# Patient Record
Sex: FEMALE | Race: BLACK OR AFRICAN AMERICAN | Employment: FULL TIME | URBAN - METROPOLITAN AREA
[De-identification: names, ages, dates, MRNs, and addresses within clinical notes are randomized per-mention and may not be internally consistent; named-entity substitution may affect disease eponyms.]

---

## 2021-04-03 ENCOUNTER — APPOINTMENT (OUTPATIENT)
Dept: GENERAL RADIOLOGY | Age: 36
End: 2021-04-03
Attending: EMERGENCY MEDICINE
Payer: COMMERCIAL

## 2021-04-03 ENCOUNTER — HOSPITAL ENCOUNTER (EMERGENCY)
Age: 36
Discharge: HOME | End: 2021-04-03
Attending: EMERGENCY MEDICINE
Payer: COMMERCIAL

## 2021-04-03 VITALS
HEART RATE: 88 BPM | WEIGHT: 135.8 LBS | DIASTOLIC BLOOD PRESSURE: 67 MMHG | BODY MASS INDEX: 21.31 KG/M2 | OXYGEN SATURATION: 98 % | HEIGHT: 67 IN | TEMPERATURE: 99.4 F | RESPIRATION RATE: 16 BRPM | SYSTOLIC BLOOD PRESSURE: 105 MMHG

## 2021-04-03 DIAGNOSIS — R50.9 FEBRILE ILLNESS, ACUTE: Primary | ICD-10-CM

## 2021-04-03 DIAGNOSIS — R11.2 NON-INTRACTABLE VOMITING WITH NAUSEA, UNSPECIFIED VOMITING TYPE: ICD-10-CM

## 2021-04-03 LAB
ALBUMIN SERPL-MCNC: 3.4 G/DL (ref 3.5–5)
ALBUMIN/GLOB SERPL: 0.8 {RATIO} (ref 1.1–2.2)
ALP SERPL-CCNC: 47 U/L (ref 45–117)
ALT SERPL-CCNC: 33 U/L (ref 12–78)
ANION GAP SERPL CALC-SCNC: 8 MMOL/L (ref 5–15)
APPEARANCE UR: ABNORMAL
AST SERPL-CCNC: 48 U/L (ref 15–37)
BACTERIA URNS QL MICRO: NEGATIVE /HPF
BASOPHILS # BLD: 0 K/UL (ref 0–0.1)
BASOPHILS NFR BLD: 0 % (ref 0–1)
BILIRUB SERPL-MCNC: 0.6 MG/DL (ref 0.2–1)
BILIRUB UR QL CFM: NEGATIVE
BUN SERPL-MCNC: 11 MG/DL (ref 6–20)
BUN/CREAT SERPL: 10 (ref 12–20)
CALCIUM SERPL-MCNC: 8 MG/DL (ref 8.5–10.1)
CHLORIDE SERPL-SCNC: 104 MMOL/L (ref 97–108)
CO2 SERPL-SCNC: 22 MMOL/L (ref 21–32)
COLOR UR: ABNORMAL
COMMENT, HOLDF: NORMAL
COVID-19 RAPID TEST, COVR: NOT DETECTED
CREAT SERPL-MCNC: 1.1 MG/DL (ref 0.55–1.02)
CRP SERPL-MCNC: 12.6 MG/DL (ref 0–0.6)
DIFFERENTIAL METHOD BLD: ABNORMAL
EOSINOPHIL # BLD: 0 K/UL (ref 0–0.4)
EOSINOPHIL NFR BLD: 0 % (ref 0–7)
EPITH CASTS URNS QL MICRO: ABNORMAL /LPF
ERYTHROCYTE [DISTWIDTH] IN BLOOD BY AUTOMATED COUNT: 17.4 % (ref 11.5–14.5)
FERRITIN SERPL-MCNC: 39 NG/ML (ref 8–252)
FIBRINOGEN PPP-MCNC: 341 MG/DL (ref 200–475)
GLOBULIN SER CALC-MCNC: 4.1 G/DL (ref 2–4)
GLUCOSE SERPL-MCNC: 132 MG/DL (ref 65–100)
GLUCOSE UR STRIP.AUTO-MCNC: NEGATIVE MG/DL
HCG UR QL: NEGATIVE
HCT VFR BLD AUTO: 31.4 % (ref 35–47)
HGB BLD-MCNC: 9.2 G/DL (ref 11.5–16)
HGB UR QL STRIP: ABNORMAL
IMM GRANULOCYTES # BLD AUTO: 0 K/UL
IMM GRANULOCYTES NFR BLD AUTO: 0 %
KETONES UR QL STRIP.AUTO: 15 MG/DL
LACTATE BLD-SCNC: 1.97 MMOL/L (ref 0.4–2)
LDH SERPL L TO P-CCNC: 363 U/L (ref 81–246)
LEUKOCYTE ESTERASE UR QL STRIP.AUTO: ABNORMAL
LYMPHOCYTES # BLD: 0.2 K/UL (ref 0.8–3.5)
LYMPHOCYTES NFR BLD: 2 % (ref 12–49)
MCH RBC QN AUTO: 22.3 PG (ref 26–34)
MCHC RBC AUTO-ENTMCNC: 29.3 G/DL (ref 30–36.5)
MCV RBC AUTO: 76.2 FL (ref 80–99)
MONOCYTES # BLD: 0.1 K/UL (ref 0–1)
MONOCYTES NFR BLD: 1 % (ref 5–13)
NEUTS BAND NFR BLD MANUAL: 8 % (ref 0–6)
NEUTS SEG # BLD: 9.6 K/UL (ref 1.8–8)
NEUTS SEG NFR BLD: 89 % (ref 32–75)
NITRITE UR QL STRIP.AUTO: NEGATIVE
NRBC # BLD: 0 K/UL (ref 0–0.01)
NRBC BLD-RTO: 0 PER 100 WBC
OTHER,OTHU: ABNORMAL
PH UR STRIP: 5.5 [PH] (ref 5–8)
PLATELET # BLD AUTO: 275 K/UL (ref 150–400)
PLATELET COMMENTS,PCOM: ABNORMAL
PMV BLD AUTO: 9.7 FL (ref 8.9–12.9)
POTASSIUM SERPL-SCNC: 3.7 MMOL/L (ref 3.5–5.1)
PROCALCITONIN SERPL-MCNC: 6.25 NG/ML
PROT SERPL-MCNC: 7.5 G/DL (ref 6.4–8.2)
PROT UR STRIP-MCNC: 100 MG/DL
RBC # BLD AUTO: 4.12 M/UL (ref 3.8–5.2)
RBC #/AREA URNS HPF: >100 /HPF (ref 0–5)
RBC MORPH BLD: ABNORMAL
SAMPLES BEING HELD,HOLD: NORMAL
SARS-COV-2, COV2: NORMAL
SODIUM SERPL-SCNC: 134 MMOL/L (ref 136–145)
SOURCE, COVRS: NORMAL
SP GR UR REFRACTOMETRY: >1.03 (ref 1–1.03)
UR CULT HOLD, URHOLD: NORMAL
UROBILINOGEN UR QL STRIP.AUTO: 1 EU/DL (ref 0.2–1)
WBC # BLD AUTO: 9.9 K/UL (ref 3.6–11)
WBC MORPH BLD: ABNORMAL
WBC URNS QL MICRO: ABNORMAL /HPF (ref 0–4)

## 2021-04-03 PROCEDURE — 81001 URINALYSIS AUTO W/SCOPE: CPT

## 2021-04-03 PROCEDURE — 81025 URINE PREGNANCY TEST: CPT

## 2021-04-03 PROCEDURE — 85025 COMPLETE CBC W/AUTO DIFF WBC: CPT

## 2021-04-03 PROCEDURE — 93005 ELECTROCARDIOGRAM TRACING: CPT

## 2021-04-03 PROCEDURE — 96361 HYDRATE IV INFUSION ADD-ON: CPT

## 2021-04-03 PROCEDURE — 87635 SARS-COV-2 COVID-19 AMP PRB: CPT

## 2021-04-03 PROCEDURE — 84145 PROCALCITONIN (PCT): CPT

## 2021-04-03 PROCEDURE — 96374 THER/PROPH/DIAG INJ IV PUSH: CPT

## 2021-04-03 PROCEDURE — 36415 COLL VENOUS BLD VENIPUNCTURE: CPT

## 2021-04-03 PROCEDURE — 74011250636 HC RX REV CODE- 250/636: Performed by: EMERGENCY MEDICINE

## 2021-04-03 PROCEDURE — 86140 C-REACTIVE PROTEIN: CPT

## 2021-04-03 PROCEDURE — 80053 COMPREHEN METABOLIC PANEL: CPT

## 2021-04-03 PROCEDURE — 82728 ASSAY OF FERRITIN: CPT

## 2021-04-03 PROCEDURE — U0005 INFEC AGEN DETEC AMPLI PROBE: HCPCS

## 2021-04-03 PROCEDURE — 96375 TX/PRO/DX INJ NEW DRUG ADDON: CPT

## 2021-04-03 PROCEDURE — 83605 ASSAY OF LACTIC ACID: CPT

## 2021-04-03 PROCEDURE — 85384 FIBRINOGEN ACTIVITY: CPT

## 2021-04-03 PROCEDURE — 71045 X-RAY EXAM CHEST 1 VIEW: CPT

## 2021-04-03 PROCEDURE — 74011250637 HC RX REV CODE- 250/637: Performed by: EMERGENCY MEDICINE

## 2021-04-03 PROCEDURE — 83615 LACTATE (LD) (LDH) ENZYME: CPT

## 2021-04-03 PROCEDURE — 99285 EMERGENCY DEPT VISIT HI MDM: CPT

## 2021-04-03 RX ORDER — ACETAMINOPHEN 500 MG
1000 TABLET ORAL
Status: COMPLETED | OUTPATIENT
Start: 2021-04-03 | End: 2021-04-03

## 2021-04-03 RX ORDER — ONDANSETRON 4 MG/1
4 TABLET, ORALLY DISINTEGRATING ORAL
Qty: 20 TAB | Refills: 0 | Status: SHIPPED | OUTPATIENT
Start: 2021-04-03 | End: 2021-04-06

## 2021-04-03 RX ORDER — ONDANSETRON 2 MG/ML
4 INJECTION INTRAMUSCULAR; INTRAVENOUS
Status: COMPLETED | OUTPATIENT
Start: 2021-04-03 | End: 2021-04-03

## 2021-04-03 RX ORDER — KETOROLAC TROMETHAMINE 30 MG/ML
15 INJECTION, SOLUTION INTRAMUSCULAR; INTRAVENOUS
Status: COMPLETED | OUTPATIENT
Start: 2021-04-03 | End: 2021-04-03

## 2021-04-03 RX ORDER — PROCHLORPERAZINE EDISYLATE 5 MG/ML
10 INJECTION INTRAMUSCULAR; INTRAVENOUS
Status: COMPLETED | OUTPATIENT
Start: 2021-04-03 | End: 2021-04-03

## 2021-04-03 RX ORDER — DIPHENHYDRAMINE HYDROCHLORIDE 50 MG/ML
25 INJECTION, SOLUTION INTRAMUSCULAR; INTRAVENOUS
Status: COMPLETED | OUTPATIENT
Start: 2021-04-03 | End: 2021-04-03

## 2021-04-03 RX ADMIN — SODIUM CHLORIDE 1000 ML: 9 INJECTION, SOLUTION INTRAVENOUS at 17:59

## 2021-04-03 RX ADMIN — SODIUM CHLORIDE 1000 ML: 9 INJECTION, SOLUTION INTRAVENOUS at 19:09

## 2021-04-03 RX ADMIN — KETOROLAC TROMETHAMINE 15 MG: 30 INJECTION, SOLUTION INTRAMUSCULAR at 19:58

## 2021-04-03 RX ADMIN — DIPHENHYDRAMINE HYDROCHLORIDE 25 MG: 50 INJECTION, SOLUTION INTRAMUSCULAR; INTRAVENOUS at 19:58

## 2021-04-03 RX ADMIN — ACETAMINOPHEN 1000 MG: 500 TABLET, FILM COATED ORAL at 17:59

## 2021-04-03 RX ADMIN — PROCHLORPERAZINE EDISYLATE 10 MG: 5 INJECTION INTRAMUSCULAR; INTRAVENOUS at 19:58

## 2021-04-03 RX ADMIN — ONDANSETRON 4 MG: 2 INJECTION INTRAMUSCULAR; INTRAVENOUS at 17:59

## 2021-04-03 NOTE — ED TRIAGE NOTES
Patient presents to ED for nausea/vomiting/diarrhea and dizziness. States that she started feeling \"under the weather\" yesterday and it has progressively worsened today to where she has been unable to keep any food down. Denies any sick contacts.
no

## 2021-04-03 NOTE — ED PROVIDER NOTES
Normally healthy 42-year-old female has had a fever and some vomiting. No significant cough or shortness of breath. No chest pain. No exposure to Covid that she knows of. No loss of taste or smell. No past medical history on file. No past surgical history on file. No family history on file. Social History     Socioeconomic History    Marital status: SINGLE     Spouse name: Not on file    Number of children: Not on file    Years of education: Not on file    Highest education level: Not on file   Occupational History    Not on file   Social Needs    Financial resource strain: Not on file    Food insecurity     Worry: Not on file     Inability: Not on file    Transportation needs     Medical: Not on file     Non-medical: Not on file   Tobacco Use    Smoking status: Not on file   Substance and Sexual Activity    Alcohol use: Not on file    Drug use: Not on file    Sexual activity: Not on file   Lifestyle    Physical activity     Days per week: Not on file     Minutes per session: Not on file    Stress: Not on file   Relationships    Social connections     Talks on phone: Not on file     Gets together: Not on file     Attends Denominational service: Not on file     Active member of club or organization: Not on file     Attends meetings of clubs or organizations: Not on file     Relationship status: Not on file    Intimate partner violence     Fear of current or ex partner: Not on file     Emotionally abused: Not on file     Physically abused: Not on file     Forced sexual activity: Not on file   Other Topics Concern    Not on file   Social History Narrative    Not on file         ALLERGIES: Patient has no known allergies. Review of Systems   Constitutional: Positive for fatigue and fever. HENT: Negative for trouble swallowing. Eyes: Negative for visual disturbance. Respiratory: Negative for cough. Cardiovascular: Negative for chest pain.    Gastrointestinal: Positive for vomiting. Negative for abdominal pain. Genitourinary: Negative for difficulty urinating. Musculoskeletal: Negative for gait problem. Skin: Negative for rash. Neurological: Negative for headaches. Hematological: Does not bruise/bleed easily. Psychiatric/Behavioral: Negative for sleep disturbance. Vitals:    04/03/21 1512 04/03/21 1911 04/03/21 1931   BP: (!) 103/52     Pulse: (!) 111     Resp: 18     Temp: (!) 103.1 °F (39.5 °C) (!) 101.2 °F (38.4 °C)    SpO2: 97%  97%   Weight: 61.6 kg (135 lb 12.9 oz)     Height: 5' 7\" (1.702 m)              Physical Exam  Constitutional:       Appearance: Normal appearance. HENT:      Head: Normocephalic. Nose: Nose normal.      Mouth/Throat:      Mouth: Mucous membranes are moist.   Eyes:      Extraocular Movements: Extraocular movements intact. Conjunctiva/sclera: Conjunctivae normal.   Cardiovascular:      Rate and Rhythm: Normal rate. Pulmonary:      Effort: Pulmonary effort is normal. No respiratory distress. Abdominal:      General: Abdomen is flat. Musculoskeletal: Normal range of motion. Skin:     Findings: No rash. Neurological:      General: No focal deficit present. Mental Status: She is alert. Psychiatric:         Behavior: Behavior normal.          MDM  Number of Diagnoses or Management Options  Febrile illness, acute  Non-intractable vomiting with nausea, unspecified vomiting type  Diagnosis management comments: EKG at 1907  Normal sinus with normal axis at a rate of 93  [place], QRS, and QTc all within normal limits  No ST changes  T wave inversion in lead III, V2, V3, and V4    No chest pain, no cough, no shortness of breath, no hypoxia, no leg swelling, no history of venous thromboembolism. Patient seems to have a viral illness that has caused fever and one episode of vomiting, but she otherwise looks well.   I do not see any respiratory symptoms, so even though this could be Covid, I do not think pursuing a PE work-up would be appropriate. She has a high heart rate likely from her fever and pain. I will give her fluids and analgesic/antipyretics as well as antiemetics to see if I can get her feeling better and get her heart rate better controlled. Rapid test for Covid and, if negative, send off a PCR. Chest x-ray was negative. She appears dry on her urine, but no evidence of UTI. Abdomen was soft and nontender and I do not think there is an acute intra-abdominal process that would require hospitalization or surgery. No neck stiffness or neck pain to make me think this is meningitis. Patient is also not confused. Inflammatory markers are up, which can be seen in Covid or some other viral infections. Procalcitonin was elevated, but I do not see any evidence of bacterial infection.          Procedures

## 2021-04-03 NOTE — ED NOTES

## 2021-04-04 LAB
SARS-COV-2, COV2: NOT DETECTED
SPECIMEN SOURCE, FCOV2M: NORMAL

## 2021-04-05 ENCOUNTER — PATIENT OUTREACH (OUTPATIENT)
Dept: CASE MANAGEMENT | Age: 36
End: 2021-04-05

## 2021-04-05 ENCOUNTER — HOSPITAL ENCOUNTER (EMERGENCY)
Age: 36
Discharge: HOME | DRG: 871 | End: 2021-04-05
Attending: EMERGENCY MEDICINE | Admitting: EMERGENCY MEDICINE
Payer: COMMERCIAL

## 2021-04-05 VITALS
SYSTOLIC BLOOD PRESSURE: 101 MMHG | TEMPERATURE: 99.9 F | HEART RATE: 88 BPM | OXYGEN SATURATION: 100 % | BODY MASS INDEX: 21.49 KG/M2 | HEIGHT: 67 IN | WEIGHT: 136.91 LBS | DIASTOLIC BLOOD PRESSURE: 53 MMHG | RESPIRATION RATE: 14 BRPM

## 2021-04-05 DIAGNOSIS — D64.89 ANEMIA DUE TO OTHER CAUSE, NOT CLASSIFIED: ICD-10-CM

## 2021-04-05 DIAGNOSIS — R11.2 NON-INTRACTABLE VOMITING WITH NAUSEA, UNSPECIFIED VOMITING TYPE: Primary | ICD-10-CM

## 2021-04-05 LAB
ALBUMIN SERPL-MCNC: 3 G/DL (ref 3.5–5)
ALBUMIN/GLOB SERPL: 0.9 {RATIO} (ref 1.1–2.2)
ALP SERPL-CCNC: 58 U/L (ref 45–117)
ALT SERPL-CCNC: 52 U/L (ref 12–78)
ANION GAP SERPL CALC-SCNC: 8 MMOL/L (ref 5–15)
APPEARANCE UR: CLEAR
AST SERPL-CCNC: 60 U/L (ref 15–37)
BACTERIA URNS QL MICRO: ABNORMAL /HPF
BASOPHILS # BLD: 0 K/UL (ref 0–0.1)
BASOPHILS NFR BLD: 0 % (ref 0–1)
BILIRUB SERPL-MCNC: 0.7 MG/DL (ref 0.2–1)
BILIRUB UR QL CFM: NEGATIVE
BUN SERPL-MCNC: 13 MG/DL (ref 6–20)
BUN/CREAT SERPL: 15 (ref 12–20)
CALCIUM SERPL-MCNC: 7.9 MG/DL (ref 8.5–10.1)
CHLORIDE SERPL-SCNC: 108 MMOL/L (ref 97–108)
CO2 SERPL-SCNC: 21 MMOL/L (ref 21–32)
COLOR UR: ABNORMAL
COMMENT, HOLDF: NORMAL
COMMENT, HOLDF: NORMAL
CREAT SERPL-MCNC: 0.85 MG/DL (ref 0.55–1.02)
DIFFERENTIAL METHOD BLD: ABNORMAL
EOSINOPHIL # BLD: 0 K/UL (ref 0–0.4)
EOSINOPHIL NFR BLD: 0 % (ref 0–7)
EPITH CASTS URNS QL MICRO: ABNORMAL /LPF
ERYTHROCYTE [DISTWIDTH] IN BLOOD BY AUTOMATED COUNT: 17.8 % (ref 11.5–14.5)
GLOBULIN SER CALC-MCNC: 3.5 G/DL (ref 2–4)
GLUCOSE SERPL-MCNC: 112 MG/DL (ref 65–100)
GLUCOSE UR STRIP.AUTO-MCNC: NEGATIVE MG/DL
HCG UR QL: NEGATIVE
HCT VFR BLD AUTO: 24.6 % (ref 35–47)
HGB BLD-MCNC: 7.6 G/DL (ref 11.5–16)
HGB UR QL STRIP: ABNORMAL
HYALINE CASTS URNS QL MICRO: ABNORMAL /LPF (ref 0–5)
IMM GRANULOCYTES # BLD AUTO: 0 K/UL
IMM GRANULOCYTES NFR BLD AUTO: 0 %
KETONES UR QL STRIP.AUTO: NEGATIVE MG/DL
LACTATE SERPL-SCNC: 1.7 MMOL/L (ref 0.4–2)
LEUKOCYTE ESTERASE UR QL STRIP.AUTO: NEGATIVE
LIPASE SERPL-CCNC: 410 U/L (ref 73–393)
LYMPHOCYTES # BLD: 0 K/UL (ref 0.8–3.5)
LYMPHOCYTES NFR BLD: 1 % (ref 12–49)
MCH RBC QN AUTO: 22.3 PG (ref 26–34)
MCHC RBC AUTO-ENTMCNC: 30.9 G/DL (ref 30–36.5)
MCV RBC AUTO: 72.1 FL (ref 80–99)
MONOCYTES # BLD: 0 K/UL (ref 0–1)
MONOCYTES NFR BLD: 0 % (ref 5–13)
NEUTS BAND NFR BLD MANUAL: 14 % (ref 0–6)
NEUTS SEG # BLD: 4.2 K/UL (ref 1.8–8)
NEUTS SEG NFR BLD: 85 % (ref 32–75)
NITRITE UR QL STRIP.AUTO: NEGATIVE
NRBC # BLD: 0 K/UL (ref 0–0.01)
NRBC BLD-RTO: 0 PER 100 WBC
PH UR STRIP: 5.5 [PH] (ref 5–8)
PLATELET # BLD AUTO: 173 K/UL (ref 150–400)
PMV BLD AUTO: 10.4 FL (ref 8.9–12.9)
POTASSIUM SERPL-SCNC: 3.5 MMOL/L (ref 3.5–5.1)
PROT SERPL-MCNC: 6.5 G/DL (ref 6.4–8.2)
PROT UR STRIP-MCNC: 100 MG/DL
RBC # BLD AUTO: 3.41 M/UL (ref 3.8–5.2)
RBC #/AREA URNS HPF: ABNORMAL /HPF (ref 0–5)
RBC MORPH BLD: ABNORMAL
RBC MORPH BLD: ABNORMAL
SAMPLES BEING HELD,HOLD: NORMAL
SAMPLES BEING HELD,HOLD: NORMAL
SODIUM SERPL-SCNC: 137 MMOL/L (ref 136–145)
SP GR UR REFRACTOMETRY: 1.02 (ref 1–1.03)
UROBILINOGEN UR QL STRIP.AUTO: 1 EU/DL (ref 0.2–1)
WBC # BLD AUTO: 4.2 K/UL (ref 3.6–11)
WBC URNS QL MICRO: ABNORMAL /HPF (ref 0–4)

## 2021-04-05 PROCEDURE — 85025 COMPLETE CBC W/AUTO DIFF WBC: CPT

## 2021-04-05 PROCEDURE — 80053 COMPREHEN METABOLIC PANEL: CPT

## 2021-04-05 PROCEDURE — 81025 URINE PREGNANCY TEST: CPT

## 2021-04-05 PROCEDURE — 36415 COLL VENOUS BLD VENIPUNCTURE: CPT

## 2021-04-05 PROCEDURE — 99284 EMERGENCY DEPT VISIT MOD MDM: CPT

## 2021-04-05 PROCEDURE — 74011250636 HC RX REV CODE- 250/636: Performed by: EMERGENCY MEDICINE

## 2021-04-05 PROCEDURE — 74011250637 HC RX REV CODE- 250/637: Performed by: EMERGENCY MEDICINE

## 2021-04-05 PROCEDURE — 83605 ASSAY OF LACTIC ACID: CPT

## 2021-04-05 PROCEDURE — 83690 ASSAY OF LIPASE: CPT

## 2021-04-05 PROCEDURE — 96374 THER/PROPH/DIAG INJ IV PUSH: CPT

## 2021-04-05 PROCEDURE — 81001 URINALYSIS AUTO W/SCOPE: CPT

## 2021-04-05 RX ORDER — ONDANSETRON 2 MG/ML
4 INJECTION INTRAMUSCULAR; INTRAVENOUS
Status: COMPLETED | OUTPATIENT
Start: 2021-04-05 | End: 2021-04-05

## 2021-04-05 RX ORDER — ACETAMINOPHEN 500 MG
1000 TABLET ORAL ONCE
Status: COMPLETED | OUTPATIENT
Start: 2021-04-05 | End: 2021-04-05

## 2021-04-05 RX ORDER — METOCLOPRAMIDE 10 MG/1
10 TABLET ORAL
Qty: 12 TAB | Refills: 0 | Status: SHIPPED | OUTPATIENT
Start: 2021-04-05 | End: 2021-04-06

## 2021-04-05 RX ORDER — IBUPROFEN 600 MG/1
600 TABLET ORAL
Qty: 30 TAB | Refills: 0 | Status: SHIPPED | OUTPATIENT
Start: 2021-04-05 | End: 2021-04-06

## 2021-04-05 RX ADMIN — ONDANSETRON 4 MG: 2 INJECTION INTRAMUSCULAR; INTRAVENOUS at 04:02

## 2021-04-05 RX ADMIN — SODIUM CHLORIDE 1000 ML: 9 INJECTION, SOLUTION INTRAVENOUS at 04:02

## 2021-04-05 RX ADMIN — ACETAMINOPHEN 1000 MG: 500 TABLET ORAL at 04:13

## 2021-04-05 NOTE — ED TRIAGE NOTES
Pt arrives ambulatory to triage w/ CC N/V and headache. Pt states she had a fever of 103.2 earlier in the day. States these sx started Friday. Was seen at [facility] yesterday and has negative workup. Denies sick contacts. Endorses diarrhea, abd pain, body aches, chills. Currently on her period. Denies CP/ SOB.

## 2021-04-05 NOTE — DISCHARGE INSTRUCTIONS
Thank you for allowing us to provide you with medical care today. We realize that you have many choices for your emergency care needs. We thank you for choosing [facility].  Please choose us in the future for any continued health care needs. We hope we addressed all of your medical concerns. We strive to provide excellent quality care in the Emergency Department. Anything less than excellent does not meet our expectations. The exam and treatment you received in the Emergency Department were for an emergent problem and are not intended as complete care. It is important that you follow up with a doctor, nurse practitioner, or [address] [place] [address] assistant for ongoing care. If your symptoms worsen or you do not improve as expected and you are unable to reach your usual health care provider, you should return to the Emergency Department. We are available 24 hours a day. Take this sheet with you when you go to your follow-up visit. If you have any problem arranging the follow-up visit, contact the Emergency Department immediately. Make an appointment your family doctor for follow up of this visit. Return to the ER if you are unable to be seen in a timely manner.

## 2021-04-05 NOTE — ED PROVIDER NOTES
Please note that this dictation was completed with [employer], the computer voice recognition software.  Quite often unanticipated grammatical, syntax, homophones, and other interpretive errors are inadvertently transcribed by the computer software.  Please disregard these errors.  Please excuse any errors that have escaped final proofreading. 80-year-old female no significant past medical history presents the ER complaining of \"been having fevers chills x3 days. Patient's states she is also been nauseated and vomiting x3 days (since Friday). Patient states her fever has been as high as 103 and is Thomas 100. She is also had generalized malaise was seen 2 days ago at [address] [place] [address] ER had a negative Covid test negative ED evaluation. Patient presents the ER here today because \"I think I am still sick. Patient states she attempted to take some ibuprofen this morning at 230 but was unable to keep it down and threw it up. Patient states she has not had any burning with urination completed her menstrual cycle earlier today and usually has been taking Tylenol to control her fevers. Patient states she has had 3-4 episodes of diarrhea yesterday and earlier today (no blood) and she had several episodes of vomiting yesterday with an increased number of episodes of vomiting today (4 times). pt denies HA, vison changes, diff swallowing, CP, SOB, Abd pain, Cons or other current systemic complaints    Social/ PSH reviewed in EMR    EMR Chart Reviewed           No past medical history on file. No past surgical history on file. No family history on file.     Social History     Socioeconomic History    Marital status: SINGLE     Spouse name: Not on file    Number of children: Not on file    Years of education: Not on file    Highest education level: Not on file   Occupational History    Not on file   Social Needs    Financial resource strain: Not on file    Food insecurity     Worry: Not on file     Inability: Not on file    Transportation needs     Medical: Not on file     Non-medical: Not on file   Tobacco Use    Smoking status: Not on file   Substance and Sexual Activity    Alcohol use: Not on file    Drug use: Not on file    Sexual activity: Not on file   Lifestyle    Physical activity     Days per week: Not on file     Minutes per session: Not on file    Stress: Not on file   Relationships    Social connections     Talks on phone: Not on file     Gets together: Not on file     Attends Nondenominational service: Not on file     Active member of club or organization: Not on file     Attends meetings of clubs or organizations: Not on file     Relationship status: Not on file    Intimate partner violence     Fear of current or ex partner: Not on file     Emotionally abused: Not on file     Physically abused: Not on file     Forced sexual activity: Not on file   Other Topics Concern    Not on file   Social History Narrative    Not on file         ALLERGIES: Patient has no known allergies. Review of Systems   Constitutional: Positive for chills and fever. HENT: Negative for drooling, sore throat, trouble swallowing and voice change. Eyes: Negative for photophobia and visual disturbance. Respiratory: Negative for cough, choking, chest tightness and shortness of breath. Cardiovascular: Negative for chest pain, palpitations and leg swelling. Gastrointestinal: Positive for abdominal pain, diarrhea, nausea and vomiting. Genitourinary: Positive for vaginal bleeding. Negative for dysuria and vaginal discharge. Musculoskeletal: Positive for myalgias. Skin: Negative for rash. Neurological: Negative for speech difficulty. All other systems reviewed and are negative.       Vitals:    04/05/21 0428 04/05/21 0445 04/05/21 0510 04/05/21 0512   BP:  94/65     Pulse:    88   Resp:       Temp:   (!) 100.9 °F (38.3 °C)    SpO2: 100% 100%     Weight:       Height:                Physical Exam  Vitals signs and nursing note reviewed. Constitutional:       General: She is not in acute distress. Appearance: Normal appearance. She is well-developed. She is not ill-appearing, toxic-appearing or diaphoretic. HENT:      Head: Normocephalic and atraumatic. Right Ear: External ear normal.      Left Ear: External ear normal.   Eyes:      General: No scleral icterus. Right eye: No discharge. Left eye: No discharge. Extraocular Movements: Extraocular movements intact. Conjunctiva/sclera: Conjunctivae normal.      Pupils: Pupils are equal, round, and reactive to light. Neck:      Musculoskeletal: Normal range of motion and neck supple. Vascular: No JVD. Trachea: No tracheal deviation. Cardiovascular:      Rate and Rhythm: Normal rate and regular rhythm. Pulses: Normal pulses. Heart sounds: Normal heart sounds. No murmur. No friction rub. No gallop. Pulmonary:      Effort: Pulmonary effort is normal. No respiratory distress. Breath sounds: Normal breath sounds. No stridor. No wheezing, rhonchi or rales. Chest:      Chest wall: No tenderness. Abdominal:      General: Bowel sounds are normal.      Palpations: Abdomen is soft. Tenderness: There is no abdominal tenderness. There is no guarding or rebound. Hernia: No hernia is present. Comments: nttp       Genitourinary:     Vagina: No vaginal discharge. Comments: Pt denies urinary/ vaginal complaints  Musculoskeletal: Normal range of motion. General: No tenderness or deformity. Right lower leg: No edema. Left lower leg: No edema. Skin:     General: Skin is warm and dry. Capillary Refill: Capillary refill takes less than 2 seconds. Coloration: Skin is not jaundiced or pale. Findings: No bruising, erythema, lesion or rash. Neurological:      General: No focal deficit present. Mental Status: She is alert and oriented to person, place, and time.       Cranial Nerves: No cranial nerve deficit. Sensory: No sensory deficit. Motor: No weakness or abnormal muscle tone. Coordination: Coordination normal.      Gait: Gait normal.      Deep Tendon Reflexes: Reflexes normal.   Psychiatric:         Behavior: Behavior normal.         Thought Content: Thought content normal.          MDM       Procedures    6:14 AM 'feeling better now'; Offered/ refused CT abd/Pel; Pt has had menorrhagia/ no prior transfusions; discussed using flintstones vitamins w/ iron, reglan and ibu; She agrees w/ this plan;  [facility] [place]  results have been reviewed with her. She has been counseled regarding her diagnosis. She verbally conveys understanding and agreement of the signs, symptoms, diagnosis, treatment and prognosis and additionally agrees to Call/ Arrange follow up as recommended with Jacques Chavez MD in 24 - 48 hours. She also agrees with the care-plan and conveys that all of her questions have been answered. I have also put together some discharge instructions for her that include: 1) educational information regarding their diagnosis, 2) how to care for their diagnosis at home, as well a 3) list of reasons why they would want to return to the ED prior to their follow-up appointment, should their condition change or for concerns.

## 2021-04-05 NOTE — ED NOTES
Patient received discharge instructions by MD and RN. Reviewed discharge instructions with patient. Patient verbalized understanding of discharge teaching. Patient left ED via wheelchair. Patient reports relief of most intense pain.

## 2021-04-05 NOTE — PROGRESS NOTES
Patient contacted regarding Carola Liming. Discussed COVID-19 related testing which was available at this time. Test results were negative. Patient informed of results, if available? yes     LPN Care Coordinator contacted the patient by telephone to perform post discharge assessment. Call within 2 business days of discharge: Yes Verified name and  with patient as identifiers. Provided introduction to self, and explanation of the CTN/ACM role, and reason for call due to risk factors for infection and/or exposure to COVID-19. Symptoms reviewed with patient who verbalized the following symptoms: no new symptoms and no worsening symptoms      Due to no new or worsening symptoms encounter was not routed to provider for escalation. Discussed follow-up appointments. If no appointment was previously scheduled, appointment scheduling offered:  [facility] follow up appointment(s): No future appointments. Non-[facility] follow up appointment(s): patient is to schedule follow up appointment if needed. Contact information given regarding [facility]. Advance Care Planning:   Does patient have an Advance Directive:  patient report Devin Bae (mother) [phone] is the healthcare decision maker. Patient has following risk factors of: no known risk factors. LPN CC reviewed discharge instructions, medical action plan and red flags such as increased shortness of breath, increasing fever and signs of decompensation with patient who verbalized understanding. Discussed exposure protocols and quarantine with CDC Guidelines What to do if you are sick with coronavirus disease .  Patient was given an opportunity for questions and concerns. The patient agrees to contact the [facility] exposure line [phone], local [facility]  [phone] and PCP office for questions related to their healthcare. LPN CC provided contact information for future needs.     Reviewed and educated patient on any new and changed medications related to discharge diagnosis     Was patient discharged with a pulse oximeter? no     Patient/family/caregiver given information for [employer] and agrees to enroll no      Plan for follow-up call in 5-7 days based on severity of symptoms and risk factors.

## 2021-04-06 ENCOUNTER — APPOINTMENT (OUTPATIENT)
Dept: MRI IMAGING | Age: 36
DRG: 871 | End: 2021-04-06
Attending: HOSPITALIST
Payer: COMMERCIAL

## 2021-04-06 ENCOUNTER — HOSPITAL ENCOUNTER (INPATIENT)
Age: 36
LOS: 6 days | Discharge: HOME | DRG: 871 | End: 2021-04-13
Attending: EMERGENCY MEDICINE | Admitting: HOSPITALIST
Payer: COMMERCIAL

## 2021-04-06 ENCOUNTER — APPOINTMENT (OUTPATIENT)
Dept: CT IMAGING | Age: 36
DRG: 871 | End: 2021-04-06
Attending: EMERGENCY MEDICINE
Payer: COMMERCIAL

## 2021-04-06 ENCOUNTER — APPOINTMENT (OUTPATIENT)
Dept: MRI IMAGING | Age: 36
DRG: 871 | End: 2021-04-06
Attending: EMERGENCY MEDICINE
Payer: COMMERCIAL

## 2021-04-06 DIAGNOSIS — R50.9 FEVER OF UNKNOWN ORIGIN: ICD-10-CM

## 2021-04-06 DIAGNOSIS — G93.89 CEREBELLAR MASS: Primary | ICD-10-CM

## 2021-04-06 DIAGNOSIS — R50.9 FEVER, UNSPECIFIED FEVER CAUSE: ICD-10-CM

## 2021-04-06 DIAGNOSIS — I21.A1 TYPE 2 MI (MYOCARDIAL INFARCTION) (HCC): ICD-10-CM

## 2021-04-06 DIAGNOSIS — D64.9 ACUTE ON CHRONIC ANEMIA: ICD-10-CM

## 2021-04-06 DIAGNOSIS — I63.10 CEREBROVASCULAR ACCIDENT (CVA) DUE TO EMBOLISM OF PRECEREBRAL ARTERY (HCC): ICD-10-CM

## 2021-04-06 PROBLEM — R42 DIZZINESS: Status: ACTIVE | Noted: 2021-04-06

## 2021-04-06 LAB
ALBUMIN SERPL-MCNC: 2.8 G/DL (ref 3.5–5)
ALBUMIN/GLOB SERPL: 0.8 {RATIO} (ref 1.1–2.2)
ALP SERPL-CCNC: 85 U/L (ref 45–117)
ALT SERPL-CCNC: 72 U/L (ref 12–78)
ANION GAP SERPL CALC-SCNC: 10 MMOL/L (ref 5–15)
AST SERPL-CCNC: 125 U/L (ref 15–37)
ATRIAL RATE: 93 BPM
ATRIAL RATE: 96 BPM
BASOPHILS # BLD: 0 K/UL (ref 0–0.1)
BASOPHILS NFR BLD: 0 % (ref 0–1)
BILIRUB DIRECT SERPL-MCNC: 0.6 MG/DL (ref 0–0.2)
BILIRUB SERPL-MCNC: 1 MG/DL (ref 0.2–1)
BNP SERPL-MCNC: 4918 PG/ML
BUN SERPL-MCNC: 14 MG/DL (ref 6–20)
BUN/CREAT SERPL: 16 (ref 12–20)
CALCIUM SERPL-MCNC: 7.7 MG/DL (ref 8.5–10.1)
CALCULATED P AXIS, ECG09: 71 DEGREES
CALCULATED P AXIS, ECG09: 72 DEGREES
CALCULATED R AXIS, ECG10: 50 DEGREES
CALCULATED R AXIS, ECG10: 60 DEGREES
CALCULATED T AXIS, ECG11: 166 DEGREES
CALCULATED T AXIS, ECG11: 78 DEGREES
CHLORIDE SERPL-SCNC: 107 MMOL/L (ref 97–108)
CHOLEST SERPL-MCNC: 68 MG/DL
CO2 SERPL-SCNC: 21 MMOL/L (ref 21–32)
COMMENT, HOLDF: NORMAL
COMMENT, HOLDF: NORMAL
CREAT SERPL-MCNC: 0.89 MG/DL (ref 0.55–1.02)
CRP SERPL-MCNC: 22.2 MG/DL (ref 0–0.6)
DIAGNOSIS, 93000: NORMAL
DIAGNOSIS, 93000: NORMAL
DIFFERENTIAL METHOD BLD: ABNORMAL
EOSINOPHIL # BLD: 0 K/UL (ref 0–0.4)
EOSINOPHIL NFR BLD: 0 % (ref 0–7)
ERYTHROCYTE [DISTWIDTH] IN BLOOD BY AUTOMATED COUNT: 18.1 % (ref 11.5–14.5)
ERYTHROCYTE [SEDIMENTATION RATE] IN BLOOD: 42 MM/HR (ref 0–20)
ERYTHROCYTE [SEDIMENTATION RATE] IN BLOOD: 50 MM/HR (ref 0–20)
EST. AVERAGE GLUCOSE BLD GHB EST-MCNC: 111 MG/DL
FACT VIII ACT/NOR PPP: 239 % (ref 80–200)
FIBRINOGEN PPP-MCNC: 625 MG/DL (ref 200–475)
GLOBULIN SER CALC-MCNC: 3.6 G/DL (ref 2–4)
GLUCOSE BLD STRIP.AUTO-MCNC: 110 MG/DL (ref 65–100)
GLUCOSE SERPL-MCNC: 103 MG/DL (ref 65–100)
HAPTOGLOB SERPL-MCNC: 276 MG/DL (ref 30–200)
HBA1C MFR BLD: 5.5 % (ref 4–5.6)
HCT VFR BLD AUTO: 23.5 % (ref 35–47)
HDLC SERPL-MCNC: 17 MG/DL
HDLC SERPL: 4 {RATIO} (ref 0–5)
HGB BLD-MCNC: 7 G/DL (ref 11.5–16)
HISTORY CHECKED?,CKHIST: NORMAL
IMM GRANULOCYTES # BLD AUTO: 0 K/UL
IMM GRANULOCYTES NFR BLD AUTO: 0 %
INR PPP: 1.1 (ref 0.9–1.1)
IRON SATN MFR SERPL: ABNORMAL % (ref 20–50)
IRON SERPL-MCNC: <5 UG/DL (ref 35–150)
LDH SERPL L TO P-CCNC: 309 U/L (ref 81–246)
LDLC SERPL CALC-MCNC: 16.6 MG/DL (ref 0–100)
LIPID PROFILE,FLP: ABNORMAL
LYMPHOCYTES # BLD: 0.1 K/UL (ref 0.8–3.5)
LYMPHOCYTES NFR BLD: 3 % (ref 12–49)
MCH RBC QN AUTO: 21.9 PG (ref 26–34)
MCHC RBC AUTO-ENTMCNC: 29.8 G/DL (ref 30–36.5)
MCV RBC AUTO: 73.4 FL (ref 80–99)
MONOCYTES # BLD: 0.1 K/UL (ref 0–1)
MONOCYTES NFR BLD: 2 % (ref 5–13)
NEUTS BAND NFR BLD MANUAL: 7 % (ref 0–6)
NEUTS SEG # BLD: 3.6 K/UL (ref 1.8–8)
NEUTS SEG NFR BLD: 88 % (ref 32–75)
NRBC # BLD: 0 K/UL (ref 0–0.01)
NRBC BLD-RTO: 0 PER 100 WBC
P-R INTERVAL, ECG05: 154 MS
P-R INTERVAL, ECG05: 160 MS
PLATELET # BLD AUTO: 134 K/UL (ref 150–400)
PLATELET COMMENTS,PCOM: ABNORMAL
PMV BLD AUTO: 10.7 FL (ref 8.9–12.9)
POTASSIUM SERPL-SCNC: 3.8 MMOL/L (ref 3.5–5.1)
PROT SERPL-MCNC: 6.4 G/DL (ref 6.4–8.2)
PROTHROMBIN TIME: 11 SEC (ref 9–11.1)
Q-T INTERVAL, ECG07: 344 MS
Q-T INTERVAL, ECG07: 372 MS
QRS DURATION, ECG06: 74 MS
QRS DURATION, ECG06: 80 MS
QTC CALCULATION (BEZET), ECG08: 434 MS
QTC CALCULATION (BEZET), ECG08: 462 MS
RBC # BLD AUTO: 3.2 M/UL (ref 3.8–5.2)
RBC MORPH BLD: ABNORMAL
RETICS # AUTO: 0.01 M/UL (ref 0.02–0.08)
RETICS/RBC NFR AUTO: 0.2 % (ref 0.7–2.1)
SAMPLES BEING HELD,HOLD: NORMAL
SAMPLES BEING HELD,HOLD: NORMAL
SARS-COV-2 TOTAL ANTIBODY, CVTOT: NONREACTIVE
SARS-COV-2, COV2: NORMAL
SERVICE CMNT-IMP: ABNORMAL
SODIUM SERPL-SCNC: 138 MMOL/L (ref 136–145)
T4 FREE SERPL-MCNC: 1.4 NG/DL (ref 0.8–1.5)
TIBC SERPL-MCNC: 303 UG/DL (ref 250–450)
TRIGL SERPL-MCNC: 172 MG/DL (ref ?–150)
TROPONIN I SERPL-MCNC: 0.47 NG/ML
TROPONIN I SERPL-MCNC: 0.56 NG/ML
TROPONIN I SERPL-MCNC: 0.61 NG/ML
TSH SERPL DL<=0.05 MIU/L-ACNC: 0.47 UIU/ML (ref 0.36–3.74)
VENTRICULAR RATE, ECG03: 93 BPM
VENTRICULAR RATE, ECG03: 96 BPM
VLDLC SERPL CALC-MCNC: 34.4 MG/DL
WBC # BLD AUTO: 3.8 K/UL (ref 3.6–11)

## 2021-04-06 PROCEDURE — 99285 EMERGENCY DEPT VISIT HI MDM: CPT

## 2021-04-06 PROCEDURE — 87077 CULTURE AEROBIC IDENTIFY: CPT

## 2021-04-06 PROCEDURE — 74011250636 HC RX REV CODE- 250/636: Performed by: HOSPITALIST

## 2021-04-06 PROCEDURE — 99218 HC RM OBSERVATION: CPT

## 2021-04-06 PROCEDURE — 74011250637 HC RX REV CODE- 250/637: Performed by: NURSE PRACTITIONER

## 2021-04-06 PROCEDURE — 86901 BLOOD TYPING SEROLOGIC RH(D): CPT

## 2021-04-06 PROCEDURE — 86923 COMPATIBILITY TEST ELECTRIC: CPT

## 2021-04-06 PROCEDURE — A9575 INJ GADOTERATE MEGLUMI 0.1ML: HCPCS | Performed by: HOSPITALIST

## 2021-04-06 PROCEDURE — 86146 BETA-2 GLYCOPROTEIN ANTIBODY: CPT

## 2021-04-06 PROCEDURE — 36430 TRANSFUSION BLD/BLD COMPNT: CPT

## 2021-04-06 PROCEDURE — 86147 CARDIOLIPIN ANTIBODY EA IG: CPT

## 2021-04-06 PROCEDURE — 74011000258 HC RX REV CODE- 258: Performed by: EMERGENCY MEDICINE

## 2021-04-06 PROCEDURE — 93005 ELECTROCARDIOGRAM TRACING: CPT

## 2021-04-06 PROCEDURE — 30233N1 TRANSFUSION OF NONAUTOLOGOUS RED BLOOD CELLS INTO PERIPHERAL VEIN, PERCUTANEOUS APPROACH: ICD-10-PCS | Performed by: HOSPITALIST

## 2021-04-06 PROCEDURE — 96375 TX/PRO/DX INJ NEW DRUG ADDON: CPT

## 2021-04-06 PROCEDURE — 74011250636 HC RX REV CODE- 250/636: Performed by: EMERGENCY MEDICINE

## 2021-04-06 PROCEDURE — 84443 ASSAY THYROID STIM HORMONE: CPT

## 2021-04-06 PROCEDURE — 86140 C-REACTIVE PROTEIN: CPT

## 2021-04-06 PROCEDURE — 87040 BLOOD CULTURE FOR BACTERIA: CPT

## 2021-04-06 PROCEDURE — P9016 RBC LEUKOCYTES REDUCED: HCPCS

## 2021-04-06 PROCEDURE — 96374 THER/PROPH/DIAG INJ IV PUSH: CPT

## 2021-04-06 PROCEDURE — APPNB30 APP NON BILLABLE TIME 0-30 MINS: Performed by: NURSE PRACTITIONER

## 2021-04-06 PROCEDURE — 70553 MRI BRAIN STEM W/O & W/DYE: CPT

## 2021-04-06 PROCEDURE — 81241 F5 GENE: CPT

## 2021-04-06 PROCEDURE — 80061 LIPID PANEL: CPT

## 2021-04-06 PROCEDURE — 0042T CT CODE NEURO PERF W CBF: CPT

## 2021-04-06 PROCEDURE — 83010 ASSAY OF HAPTOGLOBIN QUANT: CPT

## 2021-04-06 PROCEDURE — 99223 1ST HOSP IP/OBS HIGH 75: CPT | Performed by: PSYCHIATRY & NEUROLOGY

## 2021-04-06 PROCEDURE — 70450 CT HEAD/BRAIN W/O DYE: CPT

## 2021-04-06 PROCEDURE — 84439 ASSAY OF FREE THYROXINE: CPT

## 2021-04-06 PROCEDURE — 85652 RBC SED RATE AUTOMATED: CPT

## 2021-04-06 PROCEDURE — 83036 HEMOGLOBIN GLYCOSYLATED A1C: CPT

## 2021-04-06 PROCEDURE — 85306 CLOT INHIBIT PROT S FREE: CPT

## 2021-04-06 PROCEDURE — 87147 CULTURE TYPE IMMUNOLOGIC: CPT

## 2021-04-06 PROCEDURE — 84484 ASSAY OF TROPONIN QUANT: CPT

## 2021-04-06 PROCEDURE — 4A03X5D MEASUREMENT OF ARTERIAL FLOW, INTRACRANIAL, EXTERNAL APPROACH: ICD-10-PCS | Performed by: RADIOLOGY

## 2021-04-06 PROCEDURE — 74011000636 HC RX REV CODE- 636: Performed by: RADIOLOGY

## 2021-04-06 PROCEDURE — 85610 PROTHROMBIN TIME: CPT

## 2021-04-06 PROCEDURE — 74011250637 HC RX REV CODE- 250/637: Performed by: PSYCHIATRY & NEUROLOGY

## 2021-04-06 PROCEDURE — 99223 1ST HOSP IP/OBS HIGH 75: CPT | Performed by: INTERNAL MEDICINE

## 2021-04-06 PROCEDURE — 85045 AUTOMATED RETICULOCYTE COUNT: CPT

## 2021-04-06 PROCEDURE — 85302 CLOT INHIBIT PROT C ANTIGEN: CPT

## 2021-04-06 PROCEDURE — 83880 ASSAY OF NATRIURETIC PEPTIDE: CPT

## 2021-04-06 PROCEDURE — 83615 LACTATE (LD) (LDH) ENZYME: CPT

## 2021-04-06 PROCEDURE — 82962 GLUCOSE BLOOD TEST: CPT

## 2021-04-06 PROCEDURE — 80048 BASIC METABOLIC PNL TOTAL CA: CPT

## 2021-04-06 PROCEDURE — 85732 THROMBOPLASTIN TIME PARTIAL: CPT

## 2021-04-06 PROCEDURE — 85300 ANTITHROMBIN III ACTIVITY: CPT

## 2021-04-06 PROCEDURE — U0003 INFECTIOUS AGENT DETECTION BY NUCLEIC ACID (DNA OR RNA); SEVERE ACUTE RESPIRATORY SYNDROME CORONAVIRUS 2 (SARS-COV-2) (CORONAVIRUS DISEASE [COVID-19]), AMPLIFIED PROBE TECHNIQUE, MAKING USE OF HIGH THROUGHPUT TECHNOLOGIES AS DESCRIBED BY CMS-2020-01-R: HCPCS

## 2021-04-06 PROCEDURE — 85384 FIBRINOGEN ACTIVITY: CPT

## 2021-04-06 PROCEDURE — 80076 HEPATIC FUNCTION PANEL: CPT

## 2021-04-06 PROCEDURE — 85305 CLOT INHIBIT PROT S TOTAL: CPT

## 2021-04-06 PROCEDURE — 83550 IRON BINDING TEST: CPT

## 2021-04-06 PROCEDURE — 85303 CLOT INHIBIT PROT C ACTIVITY: CPT

## 2021-04-06 PROCEDURE — 70498 CT ANGIOGRAPHY NECK: CPT

## 2021-04-06 PROCEDURE — 85240 CLOT FACTOR VIII AHG 1 STAGE: CPT

## 2021-04-06 PROCEDURE — 87186 SC STD MICRODIL/AGAR DIL: CPT

## 2021-04-06 PROCEDURE — 85025 COMPLETE CBC W/AUTO DIFF WBC: CPT

## 2021-04-06 PROCEDURE — 94762 N-INVAS EAR/PLS OXIMTRY CONT: CPT

## 2021-04-06 PROCEDURE — 36415 COLL VENOUS BLD VENIPUNCTURE: CPT

## 2021-04-06 PROCEDURE — 86769 SARS-COV-2 COVID-19 ANTIBODY: CPT

## 2021-04-06 RX ORDER — GADOTERATE MEGLUMINE 376.9 MG/ML
12 INJECTION INTRAVENOUS
Status: COMPLETED | OUTPATIENT
Start: 2021-04-06 | End: 2021-04-06

## 2021-04-06 RX ORDER — ASPIRIN 81 MG/1
81 TABLET ORAL DAILY
Status: DISCONTINUED | OUTPATIENT
Start: 2021-04-06 | End: 2021-04-06

## 2021-04-06 RX ORDER — GUAIFENESIN 100 MG/5ML
81 LIQUID (ML) ORAL DAILY
Status: DISCONTINUED | OUTPATIENT
Start: 2021-04-06 | End: 2021-04-13 | Stop reason: HOSPADM

## 2021-04-06 RX ORDER — SODIUM CHLORIDE 9 MG/ML
250 INJECTION, SOLUTION INTRAVENOUS AS NEEDED
Status: DISCONTINUED | OUTPATIENT
Start: 2021-04-06 | End: 2021-04-13 | Stop reason: HOSPADM

## 2021-04-06 RX ORDER — ATORVASTATIN CALCIUM 40 MG/1
40 TABLET, FILM COATED ORAL DAILY
Status: DISCONTINUED | OUTPATIENT
Start: 2021-04-06 | End: 2021-04-12

## 2021-04-06 RX ORDER — SODIUM CHLORIDE 0.9 % (FLUSH) 0.9 %
5-40 SYRINGE (ML) INJECTION EVERY 8 HOURS
Status: DISCONTINUED | OUTPATIENT
Start: 2021-04-06 | End: 2021-04-13 | Stop reason: HOSPADM

## 2021-04-06 RX ORDER — ACETAMINOPHEN 325 MG/1
650 TABLET ORAL
Status: DISCONTINUED | OUTPATIENT
Start: 2021-04-06 | End: 2021-04-13 | Stop reason: HOSPADM

## 2021-04-06 RX ORDER — SODIUM CHLORIDE 0.9 % (FLUSH) 0.9 %
10 SYRINGE (ML) INJECTION
Status: COMPLETED | OUTPATIENT
Start: 2021-04-06 | End: 2021-04-06

## 2021-04-06 RX ORDER — SODIUM CHLORIDE 9 MG/ML
25 INJECTION, SOLUTION INTRAVENOUS CONTINUOUS
Status: DISCONTINUED | OUTPATIENT
Start: 2021-04-06 | End: 2021-04-13 | Stop reason: HOSPADM

## 2021-04-06 RX ORDER — SODIUM CHLORIDE 0.9 % (FLUSH) 0.9 %
5-40 SYRINGE (ML) INJECTION AS NEEDED
Status: DISCONTINUED | OUTPATIENT
Start: 2021-04-06 | End: 2021-04-13 | Stop reason: HOSPADM

## 2021-04-06 RX ORDER — DEXAMETHASONE SODIUM PHOSPHATE 10 MG/ML
10 INJECTION INTRAMUSCULAR; INTRAVENOUS
Status: COMPLETED | OUTPATIENT
Start: 2021-04-06 | End: 2021-04-06

## 2021-04-06 RX ADMIN — LEVETIRACETAM 1000 MG: 100 INJECTION, SOLUTION INTRAVENOUS at 06:11

## 2021-04-06 RX ADMIN — IOPAMIDOL 20 ML: 755 INJECTION, SOLUTION INTRAVENOUS at 04:48

## 2021-04-06 RX ADMIN — Medication 10 ML: at 11:50

## 2021-04-06 RX ADMIN — DEXAMETHASONE SODIUM PHOSPHATE 10 MG: 10 INJECTION, SOLUTION INTRAMUSCULAR; INTRAVENOUS at 05:55

## 2021-04-06 RX ADMIN — SODIUM CHLORIDE 100 ML/HR: 9 INJECTION, SOLUTION INTRAVENOUS at 08:31

## 2021-04-06 RX ADMIN — IOPAMIDOL 100 ML: 755 INJECTION, SOLUTION INTRAVENOUS at 04:48

## 2021-04-06 RX ADMIN — Medication 10 ML: at 16:58

## 2021-04-06 RX ADMIN — ACETAMINOPHEN 650 MG: 325 TABLET, FILM COATED ORAL at 10:57

## 2021-04-06 RX ADMIN — ACETAMINOPHEN 650 MG: 325 TABLET, FILM COATED ORAL at 16:57

## 2021-04-06 RX ADMIN — ASPIRIN 81 MG: 81 TABLET, CHEWABLE ORAL at 16:57

## 2021-04-06 RX ADMIN — Medication 10 ML: at 22:00

## 2021-04-06 RX ADMIN — GADOTERATE MEGLUMINE 12 ML: 376.9 INJECTION INTRAVENOUS at 12:08

## 2021-04-06 NOTE — PROGRESS NOTES
Neurosurgery Progress Note  Xander Raad, [facility]          Admit Date: 2021   LOS: 0 days        Daily Progress Note: 2021      Subjective: The patient developed dizziness on Friday. She states she gets dizzy around her menstrual cycle. She just finished her menstrual cycle yesterday. She has uterine fibroids and has menorrhagia. The patient was weak all over and had body aches since Saturday. The patient apparently had some slurred speech and left facial droop at 1530 yesterday and the slurred speech resolved at 2200. She was seen at [facility] ER on  for fever and vomiting. She had a negative COVID test including rapid and PCR. She was sent home. She returned to the [facility] ER on  for vomiting. She had continued fevers, general malaise, dizziness. She was feeling better after her stay in the ER and refused a CT of the abdomen/pelvis. She discharged to home, but returned to [facility] ER due to the facial droop and slurred speech. She continues to complain of terrible body aches and joint pains in her legs. She states she sometimes sees spots in her field of vision but denies double vision. Denies the sensation of the room spinning with her dizziness. States it feels more like an off balance feeling. Her head CT showed possible cerebellar lesions. We were asked to evaluate the patient for that reason. Denies numbness, tingling, chest pain, difficulty swallowing, double vision and dyspnea, cough, exposure to COVID-19. Objective:     Vital signs  Temp (24hrs), Av.5 °F (37.5 °C), Min:98.1 °F (36.7 °C), Max:101.9 °F (38.8 °C)   No intake/output data recorded.    1901 -  0700  In: 100 [I.V.:100]  Out: -     Visit Vitals  /63 (BP 1 Location: Left upper arm, BP Patient Position: At rest)   Pulse (!) 113   Temp (!) 101.8 °F (38.8 °C)   Resp 28   SpO2 100%      O2 Device: Room air     Pain control  Pain Assessment  Pain Scale 1: Numeric (0 - 10)  Pain Intensity 1: 0    PT/OT  Gait                 Physical Exam:  Gen:NAD. Neuro: A&Ox3. Follows commands. Speech clear. Affect normal.  PERRL. EOMI. Flattening of nasolabial fold at rest that disappears when muscles are activated. Tongue midline. CUELLO spontaneously. Strength 5/5 in UE and LE BL. Negative drift. Mild ataxia in left hand with FTN. Otherwise coordination intact with HTS and TEOFILO.  Gait deferred. CT head without contrast on 04/06/21 shows areas of low-attenuation in both cerebellar hemispheres, left greater than right, may represent old infarcts, although underlying mass lesions cannot be excluded; consider further evaluation with contrast enhanced brain MRI. No evidence of acute infarct or intracranial hemorrhage. MRI brain with and without contrast on 04/06/21 shows numerous supratentorial and infratentorial acute infarcts as above, largest in the left superior cerebellum, most consistent with embolic etiology.     24 hour results:    Recent Results (from the past 24 hour(s))   TYPE & SCREEN    Collection Time: 04/06/21  4:46 AM   Result Value Ref Range    Crossmatch Expiration 04/09/2021,2359     ABO/Rh(D) Nguyen Bo POSITIVE     Antibody screen NEG     Unit number [id number]     Blood component type RC LR     Unit division 00     Status of unit ISSUED     Crossmatch result Compatible    SED RATE (ESR)    Collection Time: 04/06/21  4:46 AM   Result Value Ref Range    Sed rate, automated 42 (H) 0 - 20 mm/hr   C REACTIVE PROTEIN, QT    Collection Time: 04/06/21  4:46 AM   Result Value Ref Range    C-Reactive protein 22.20 (H) 0.00 - 0.60 mg/dL   CBC WITH AUTOMATED DIFF    Collection Time: 04/06/21  4:47 AM   Result Value Ref Range    WBC 3.8 3.6 - 11.0 K/uL    RBC 3.20 (L) 3.80 - 5.20 M/uL    HGB 7.0 (L) 11.5 - 16.0 g/dL    HCT 23.5 (L) 35.0 - 47.0 %    MCV 73.4 (L) 80.0 - 99.0 FL    MCH 21.9 (L) 26.0 - 34.0 PG    MCHC 29.8 (L) 30.0 - 36.5 g/dL    RDW 18.1 (H) 11.5 - 14.5 %    PLATELET 728 (L) 522 - 400 K/uL    MPV 10.7 8.9 - 12.9 FL    NRBC 0.0 0  WBC    ABSOLUTE NRBC 0.00 0.00 - 0.01 K/uL    NEUTROPHILS 88 (H) 32 - 75 %    BAND NEUTROPHILS 7 (H) 0 - 6 %    LYMPHOCYTES 3 (L) 12 - 49 %    MONOCYTES 2 (L) 5 - 13 %    EOSINOPHILS 0 0 - 7 %    BASOPHILS 0 0 - 1 %    IMMATURE GRANULOCYTES 0 %    ABS. NEUTROPHILS 3.6 1.8 - 8.0 K/UL    ABS. LYMPHOCYTES 0.1 (L) 0.8 - 3.5 K/UL    ABS. MONOCYTES 0.1 0.0 - 1.0 K/UL    ABS. EOSINOPHILS 0.0 0.0 - 0.4 K/UL    ABS. BASOPHILS 0.0 0.0 - 0.1 K/UL    ABS. IMM. GRANS. 0.0 K/UL    DF MANUAL      PLATELET COMMENTS Large Platelets      RBC COMMENTS ANISOCYTOSIS  1+        RBC COMMENTS OVALOCYTES  PRESENT        RBC COMMENTS KANU CELLS  PRESENT       METABOLIC PANEL, BASIC    Collection Time: 04/06/21  4:47 AM   Result Value Ref Range    Sodium 138 136 - 145 mmol/L    Potassium 3.8 3.5 - 5.1 mmol/L    Chloride 107 97 - 108 mmol/L    CO2 21 21 - 32 mmol/L    Anion gap 10 5 - 15 mmol/L    Glucose 103 (H) 65 - 100 mg/dL    BUN 14 6 - 20 MG/DL    Creatinine 0.89 0.55 - 1.02 MG/DL    BUN/Creatinine ratio 16 12 - 20      GFR est AA >60 >60 ml/min/1.73m2    GFR est non-AA >60 >60 ml/min/1.73m2    Calcium 7.7 (L) 8.5 - 10.1 MG/DL   PROTHROMBIN TIME + INR    Collection Time: 04/06/21  4:47 AM   Result Value Ref Range    INR 1.1 0.9 - 1.1      Prothrombin time 11.0 9.0 - 11.1 sec   TROPONIN I    Collection Time: 04/06/21  4:47 AM   Result Value Ref Range    Troponin-I, Qt. 0.56 (H) <0.05 ng/mL   SAMPLES BEING HELD    Collection Time: 04/06/21  4:47 AM   Result Value Ref Range    SAMPLES BEING HELD 1red     COMMENT        Add-on orders for these samples will be processed based on acceptable specimen integrity and analyte stability, which may vary by analyte.    SARS-COV-2 AB, TOTAL    Collection Time: 04/06/21  4:47 AM   Result Value Ref Range    SARS-CoV-2 Ab, Total NONREACTIVE NR     GLUCOSE, POC    Collection Time: 04/06/21  5:08 AM   Result Value Ref Range    Glucose (POC) 110 (H) 65 - 100 mg/dL Performed by Corrie Roman    EKG, 12 LEAD, INITIAL    Collection Time: 04/06/21  5:10 AM   Result Value Ref Range    Ventricular Rate 96 BPM    Atrial Rate 96 BPM    P-R Interval 154 ms    QRS Duration 74 ms    Q-T Interval 344 ms    QTC Calculation (Bezet) 434 ms    Calculated P Axis 72 degrees    Calculated R Axis 60 degrees    Calculated T Axis 78 degrees    Diagnosis       Normal sinus rhythm  Low voltage QRS  When compared with ECG of 03-APR-2021 19:07,  MANUAL COMPARISON REQUIRED, DATA IS UNCONFIRMED     RBC, ALLOCATE    Collection Time: 04/06/21  6:30 AM   Result Value Ref Range    HISTORY CHECKED? Historical check performed    SAMPLES BEING HELD    Collection Time: 04/06/21  7:00 AM   Result Value Ref Range    SAMPLES BEING HELD 1PST     COMMENT        Add-on orders for these samples will be processed based on acceptable specimen integrity and analyte stability, which may vary by analyte. TROPONIN I    Collection Time: 04/06/21  8:33 AM   Result Value Ref Range    Troponin-I, Qt. 0.47 (H) <0.05 ng/mL   LIPID PANEL    Collection Time: 04/06/21  8:33 AM   Result Value Ref Range    LIPID PROFILE          Cholesterol, total 68 <200 MG/DL    Triglyceride 172 (H) <150 MG/DL    HDL Cholesterol 17 MG/DL    LDL, calculated 16.6 0 - 100 MG/DL    VLDL, calculated 34.4 MG/DL    CHOL/HDL Ratio 4.0 0.0 - 5.0     HEMOGLOBIN A1C WITH EAG    Collection Time: 04/06/21  8:33 AM   Result Value Ref Range    Hemoglobin A1c 5.5 4.0 - 5.6 %    Est. average glucose 111 mg/dL          Assessment:     Active Problems:    Dizziness (4/6/2021)        Plan:   1. Embolic stroke   - MRI shows that the patient has multiple areas of stroke in the supratentorial and infratentorial area. No brain masses seen on MRI   - Neurology consult with stroke work-up   - no surgical intervention needed. The patient's strokes likely happened days ago when symptoms began. Plan d/w Dr. Dina Huff, ER nurse, mom at bedside. Will be available as needed. Please call if questions.        Kayla Mejia NP
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
## 2021-04-06 NOTE — PROGRESS NOTES
Neurocritical Care Code Stroke Documentation    Symptoms:    Dizziness, slurred speech, left sided facial droop, seen in ED yesterday for similar complaints found to have Hgb of 7.6   Last Known Well:   [address] hx:   None    Anticoagulation:   None   VAN:   Negative   NIHSS:   1a-LOC:0    1b-Month/Age:0    1c-Open/Close Hand:0    2-Best Gaze:0    3-Visual Fields:0    4-Facial Palsy:0 (left droop at rest, no droop when muscles are activated)    5a-Left Arm:0    5b-Right Arm:0    6a-Left Le    6b-Right Le    7-Limb Ataxia:0    8-Sensory:0    9-Best Language:0    10-Dysarthria:0    11-Extinction/Inattention:0  TOTAL SCORE:0   Imaging:   CT Areas of low-attenuation in both cerebellar hemispheres, left greater than right, may represent old infarcts, although underlying mass lesions cannot be excluded; consider further evaluation with contrast enhanced brain MRI. No evidence of acute infarct or intracranial hemorrhage. Plan:   TPA Candidate: NO    Mechanical thrombectomy Candidate: NO     Discussed with: Dr. Tutu Orosco    Time spent: 30 minutes.      Manju Moore NP  Neurocritical Care Nurse Practitioner  [phone]

## 2021-04-06 NOTE — ROUTINE PROCESS
TRANSFER - OUT REPORT: 
 
Verbal report given to NAVA Casey (name) on Nichole Desai  being transferred to room 404 (4E) (unit) for routine progression of care Report consisted of patients Situation, Background, Assessment and  
Recommendations(SBAR). Information from the following report(s) SBAR, ED Summary and MAR was reviewed with the receiving nurse. Lines:  
Peripheral IV 04/06/21 Right Antecubital (Active) Site Assessment Clean, dry, & intact 04/06/21 0449 Phlebitis Assessment 0 04/06/21 0449 Infiltration Assessment 0 04/06/21 0449 Dressing Status Clean, dry, & intact 04/06/21 0449 Dressing Type Transparent 04/06/21 0449 Hub Color/Line Status Pink;Flushed;Patent 04/06/21 0449 Action Taken Blood drawn 04/06/21 0449 Alcohol Cap Used No 04/06/21 0449 Peripheral IV 04/06/21 Left Forearm (Active) Site Assessment Clean, dry, & intact 04/06/21 0736 Phlebitis Assessment 0 04/06/21 0736 Infiltration Assessment 0 04/06/21 0736 Dressing Status Clean, dry, & intact 04/06/21 0736 Dressing Type Transparent 04/06/21 0736 Hub Color/Line Status Pink;Flushed;Patent 04/06/21 0736 Action Taken Blood drawn 04/06/21 0736 Alcohol Cap Used No 04/06/21 0736 Opportunity for questions and clarification was provided. Patient transported with: 
 [employer]

## 2021-04-06 NOTE — ED PROVIDER NOTES
The history is provided by the patient and a relative. Facial Droop  This is a new problem. The current episode started 1 to 2 hours ago. The problem has been rapidly improving. There was left facial focality noted. Primary symptoms include loss of balance and slurred speech. Pertinent negatives include no focal weakness, no memory loss and no mental status change. There has been a fever of 103 - 104 F. The fever has been present for 1 - 2 days. Associated symptoms include vomiting and nausea. Pertinent negatives include no shortness of breath, no confusion and no headaches. Associated symptoms comments: Fatigue, poor appetite, poor intake of fluids. History reviewed. No pertinent past medical history. No past surgical history on file. History reviewed. No pertinent family history.     Social History     Socioeconomic History    Marital status: SINGLE     Spouse name: Not on file    Number of children: Not on file    Years of education: Not on file    Highest education level: Not on file   Occupational History    Not on file   Social Needs    Financial resource strain: Not on file    Food insecurity     Worry: Not on file     Inability: Not on file    Transportation needs     Medical: Not on file     Non-medical: Not on file   Tobacco Use    Smoking status: Not on file   Substance and Sexual Activity    Alcohol use: Not on file    Drug use: Not on file    Sexual activity: Not on file   Lifestyle    Physical activity     Days per week: Not on file     Minutes per session: Not on file    Stress: Not on file   Relationships    Social connections     Talks on phone: Not on file     Gets together: Not on file     Attends Gnosticist service: Not on file     Active member of club or organization: Not on file     Attends meetings of clubs or organizations: Not on file     Relationship status: Not on file    Intimate partner violence     Fear of current or ex partner: Not on file     Emotionally abused: Not on file     Physically abused: Not on file     Forced sexual activity: Not on file   Other Topics Concern    Not on file   Social History Narrative    Not on file         ALLERGIES: Patient has no known allergies. Review of Systems   Respiratory: Negative for shortness of breath. Gastrointestinal: Positive for nausea and vomiting. Neurological: Positive for loss of balance. Negative for focal weakness and headaches. Psychiatric/Behavioral: Negative for confusion and memory loss. All other systems reviewed and are negative. Vitals:    04/06/21 0402 04/06/21 0530   BP: (!) 97/57 98/63   Pulse: (!) 109 (!) 102   Resp: 18 24   Temp: 98.1 °F (36.7 °C) 98.1 °F (36.7 °C)   SpO2: 100% 100%            Physical Exam  Vitals signs and nursing note reviewed. Constitutional:       General: She is not in acute distress. Appearance: She is well-developed. HENT:      Head: Normocephalic and atraumatic. Eyes:      Conjunctiva/sclera: Conjunctivae normal.   Neck:      Musculoskeletal: Neck supple. Cardiovascular:      Rate and Rhythm: Normal rate and regular rhythm. Heart sounds: Normal heart sounds. Pulmonary:      Effort: Pulmonary effort is normal. No respiratory distress. Breath sounds: Normal breath sounds. Abdominal:      General: There is no distension. Palpations: Abdomen is soft. Tenderness: There is no abdominal tenderness. There is no guarding. Musculoskeletal: Normal range of motion. General: No deformity. Skin:     General: Skin is warm and dry. Neurological:      Mental Status: She is alert and oriented to person, place, and time. Cranial Nerves: No cranial nerve deficit. Sensory: No sensory deficit. Coordination: Coordination normal.      Gait: Gait abnormal (staggering slightly, difficulty with tandem gait).    Psychiatric:         Behavior: Behavior normal.          MDM     80-year-old female presents with new onset of ataxia, some facial asymmetry and slurring of speech overnight. She has been seen here twice previously with febrile illness thought to be viral in nature and was being managed symptomatically. CT scan shows hypodensity in the cerebellum concerning for malignancy. Neurosurgery Dr. Haley Rg is consulted. Anemia appears to be worsening and now she has evidence of cardiac ischemia which is likely secondary. Seizure prophylaxis with Keppra, Decadron for mass-effect, and 1 unit of red blood cells ordered for resuscitation. Procedures    Critical Care Time: 33 minutes  I personally performed critical care time separate of billable procedures which may include ordering and interpretation of testing, ordering of medications, direct patient assessment and reassessment, discussion with consultants or family, and documentation. EKG 0510: Rate 96, Normal sinus rhythm, No ST segment or T wave abnormalities. Low voltage. Otherwise normal EKG. Perfect Serve Consult for Admission  6:18 AM    ED Room Number: ER06/06  Patient Name and age:  Sarah Espinoza 28 y.o.  female  Working Diagnosis:   1. Cerebellar mass    2. Acute on chronic anemia    3. Type 2 MI (myocardial infarction) ([address]    4.  Fever of unknown origin        COVID-19 Suspicion:  no  Sepsis present:  no  Reassessment needed: no  Code Status:  Full Code  Readmission: no  Isolation Requirements:  yes  Recommended Level of Care:  telemetry  Department:[facility] Adult ED - 21

## 2021-04-06 NOTE — Clinical Note
Patient Class[de-identified] OBSERVATION [729]   Type of Bed: Neuro/Stroke [9]   Reason for Observation: Dizziness, slurred speech   Admitting Diagnosis: Dizziness [[id number]]   Admitting Physician: Cynthia Maradiaga [[id number]]   Attending Physician: Alban Cook

## 2021-04-06 NOTE — PROGRESS NOTES
Consult received. Discussed with Neurology. Initial workup pending and will contact us if hypercoagulable workup abnormal. Consult cancelled per neurology.

## 2021-04-06 NOTE — PROGRESS NOTES
Physical Therapy 4/6/2021    Orders received and chart reviewed up to date. Pt currently in ED, admitted. Awaiting MRI for cerebellar mass lesion and neurosurgery consult. Will defer PT and follow-up as appropriate. Thank you.   Oriana Carter, PT, DPT

## 2021-04-06 NOTE — PROGRESS NOTES
TRANSFER - IN REPORT:    Verbal report received from NAVA Galdamez(name) on Nichole Desai  being received from ED(unit) for routine progression of care      Report consisted of patients Situation, Background, Assessment and   Recommendations(SBAR). Information from the following report(s) SBAR, Kardex, ED Summary, Intake/Output, MAR, Accordion, Recent Results, Med Rec Status, Cardiac Rhythm NSR and Alarm Parameters  was reviewed with the receiving nurse. Opportunity for questions and clarification was provided. Assessment completed upon patients arrival to unit and care assumed. Bedside shift change report given to Bladimir Mejia RN (oncoming nurse) by Mike Hubbard RN (offgoing nurse). Report included the following information SBAR, Kardex, ED Summary, Intake/Output, MAR, Accordion, Recent Results, Med Rec Status, Cardiac Rhythm NSR and Alarm Parameters .

## 2021-04-06 NOTE — CONSULTS
[facility]: Jorge Miles  [phone]  Requesting/referring provider:   Reason for Consult:    HPI: Kole Mendoza, a 28y.o. year-old who presents for evaluation of dizziness. Has PMH of anemia due to heavy periods. Developed fevers, chills, N&V on Friday, also had dizziness. Went to [facility] ED , covid test negative and went home. Dizziness continued, she had near syncopal episode on Friday but did not lose consciousness. She returned to ER on 4/5/21 with vomiting, fevers, dizziness and malaise. Was evaluated and discharged home. Returned to ER today with dizziness, slurred speech, altered gait as well as vision disturbances. States last night she developed slurred speech and vision disturbance (seeing white spots). No SOB, chest pain. No history of palpitations. She returned to ER where she had MRI brain which showed numerous supratentorial and infratential acute infarcts most consistent with embolic etiology. Cardiology has been consulted for BRITTA. Additional evaluation shows mild troponin elevation with flat trend and anemia, hgb 7. She has exhibited fevers. She denies any history of arrhythmias but reports that her pcp told her she had irregular heart beat. No history of cardiac testing. SH: never smoker, occasional glass of wine. Denies IV drug use. Takes B12 po and oregano. Works for [employer] at [employer], [employer] degree  FH: Mother HTN, tachycardia. Father HTN. Maternal grandfather had MI at age 37. Reviewed her presentation, afib, emboli etc. Needs echo asap to help determine cardiac status. hypercoag and infectious workups underway. Comfortable, in NAD, seems a bit tired/sleepy but no chest pain, no dyspnea now. Agreeable to current plans. Assessment/Plan: 1. Acute CVA, multiple   -concerning for embolic cva.   -Neurology following. Hypercoagulable workup in process.     -Will obtain stat TTE, if unremarkable, no vegetation or thrombus noted, will proceed with BRITTA on Thursday afternoon with Dr. Cynthia Bush.    -Continue telemetry for now. 2.Elevated troponin   -0.56-0.47, flat trend   -EKG with no ischemic findings.   -no chest pain or exertional symptoms   -Troponin elevation of uncertain etiology, may be due to demand of CVA but will monitor. Follow up echo. 3. Fevers   -Blood cultures pending   -further workup per primary team   -Covid PCR negative. .    4. Anemia,    -likely blood loss due to heavy menses. -PRBCs were transfused, 1 unit. She  has no past medical history on file. Cardiovascular ROS: no chest pain or dyspnea on exertion, no palpitations  Respiratory ROS: no cough, shortness of breath, or wheezing  Neurological ROS: +dizziness, had slurred speech (resolved), abnormal gait   General: fatigue, body aches. All other systems negative except as above. PE  Vitals:    04/06/21 0838 04/06/21 0908 04/06/21 1008 04/06/21 1050   BP: (!) 87/58 (!) 88/65 (!) 104/59 105/63   Pulse: (!) 107 100 (!) 108 (!) 113   Resp: 21 28 19 28   Temp: 98.5 °F (36.9 °C) 99.1 °F (37.3 °C) (!) 101.9 °F (38.8 °C) (!) 101.8 °F (38.8 °C)   SpO2: 100% 100% 100% 100%    There is no height or weight on file to calculate BMI.    General appearance - alert, well appearing, and in no distress  Mental status - affect appropriate to mood  Eyes - sclera anicteric, moist mucous membranes  Neck - supple, no significant adenopathy  Lymphatics - no  lymphadenopathy  Chest - clear to auscultation, no wheezes, rales or rhonchi  Heart - normal rate, regular rhythm, normal S1, S2, no murmurs, rubs, clicks or gallops  Abdomen - soft, nontender, nondistended, no masses or organomegaly  Back exam - symmetric  Neurological -CUELLO,  no focal deficit  Musculoskeletal - no muscular tenderness noted, normal strength  Extremities - peripheral pulses normal, no pedal edema  Skin - normal coloration  no rashes    Recent Labs:  Lab Results   Component Value Date/Time    Cholesterol, total 68 04/06/2021 08:33 AM    HDL Cholesterol 17 04/06/2021 08:33 AM    LDL, calculated 16.6 04/06/2021 08:33 AM    Triglyceride 172 (H) 04/06/2021 08:33 AM    CHOL/HDL Ratio 4.0 04/06/2021 08:33 AM     Lab Results   Component Value Date/Time    Creatinine 0.89 04/06/2021 04:47 AM     Lab Results   Component Value Date/Time    BUN 14 04/06/2021 04:47 AM     Lab Results   Component Value Date/Time    Potassium 3.8 04/06/2021 04:47 AM     Lab Results   Component Value Date/Time    Hemoglobin A1c 5.5 04/06/2021 08:33 AM     Lab Results   Component Value Date/Time    HGB 7.0 (L) 04/06/2021 04:47 AM     Lab Results   Component Value Date/Time    PLATELET 402 (L) 32/00/7441 04:47 AM       Reviewed:  History reviewed. No pertinent past medical history. Social History     Tobacco Use   Smoking Status Not on file     Social History     Substance and Sexual Activity   Alcohol Use None     No Known Allergies    Current Facility-Administered Medications   Medication Dose Route Frequency    0.9% sodium chloride infusion 250 mL  250 mL IntraVENous PRN    sodium chloride (NS) flush 5-40 mL  5-40 mL IntraVENous Q8H    sodium chloride (NS) flush 5-40 mL  5-40 mL IntraVENous PRN    0.9% sodium chloride infusion  100 mL/hr IntraVENous CONTINUOUS    atorvastatin (LIPITOR) tablet 40 mg  40 mg Oral DAILY    acetaminophen (TYLENOL) tablet 650 mg  650 mg Oral Q4H PRN    aspirin chewable tablet 81 mg  81 mg Oral DAILY     No current outpatient medications on file. Klarissa Sagastume.  FORREST Hernandes  [facility] [place]  [address], [address] [place] [address]

## 2021-04-06 NOTE — H&P
History & Physical    Primary Care Provider: Other, MD Jacques  Source of Information: Patient     History of Presenting Illness:   Yefri Lund is a 28 y.o. female who presents with dizziness. History obtained from the patient and her mother at bedside. As per patient initially went to [address] [place] [address] ED for fever chills for 3 days with nausea vomiting a Covid test was negative there and was sent home. Patient keep on feeling dizzy and came back to [facility] ER. Initial code CTA CT showed no acute issue but there was a probability of some findings in the cerebellum MRI was ordered. Patient also history of fibroid and heavy menstrual bleeding she has symptomatic anemia and has a follow-up appointment with OB/GYN next Thursday at [place]. She complained of fever and not associated with  any burning with urination completed her menstrual cycle earlier today and usually has been taking Tylenol to control her fevers. Patient states she has had 3-4 episodes of diarrhea yesterday and earlier today (no blood) and she had several episodes of vomiting yesterday with an increased number of episodes of vomiting today  Admitted for further management       Review of Systems:  Pertinent items are noted in the History of Present Illness. History reviewed. No pertinent past medical history. No past surgical history on file. Prior to Admission medications    Medication Sig Start Date End Date Taking? Authorizing Provider   metoclopramide HCl (Reglan) 10 mg tablet Take 1 Tab by mouth every six (6) hours as needed for Nausea for up to 10 days. 4/5/21 4/15/21  Loli Mcclendon MD   ibuprofen (MOTRIN) 600 mg tablet Take 1 Tab by mouth every eight (8) hours as needed for Pain. 4/5/21   Loli Mcclendon MD   ondansetron (Zofran ODT) 4 mg disintegrating tablet 1 Tab by SubLINGual route every eight (8) hours as needed for Nausea or Vomiting.  4/3/21   Douglas JUAREZ, DO     No Known Allergies   History reviewed. No pertinent family history. SOCIAL HISTORY:  Patient resides:  Independently x   Assisted Living    SNF    With family care       Smoking history:   None x   Former    Chronic      Alcohol history:   None x   Social    Chronic      Ambulates:   Independently x   w/cane    w/walker    w/wc    CODE STATUS:  DNR    Full x   Other      Objective:     Physical Exam:     Visit Vitals  BP (!) 87/58 (BP 1 Location: Left upper arm)   Pulse (!) 107   Temp 98.5 °F (36.9 °C)   Resp 21   SpO2 100%      O2 Device: Room air    General:  Alert, cooperative, no distress, appears stated age. Head:  Normocephalic, without obvious abnormality, atraumatic. Eyes:  Conjunctivae/corneas clear. PERRL, EOMs intact. Nose: Nares normal. Septum midline. Mucosa normal. No drainage or sinus tenderness. Throat: Lips, mucosa, and tongue normal. Teeth and gums normal.   Neck: Supple, symmetrical, trachea midline, no adenopathy, thyroid: no enlargement/tenderness/nodules, no carotid bruit and no JVD. Back:   Symmetric, no curvature. ROM normal. No CVA tenderness. Lungs:   Clear to auscultation bilaterally. Chest wall:  No tenderness or deformity. Heart:  Regular rate and rhythm, S1, S2 normal, no murmur, click, rub or gallop. Abdomen:   Soft, non-tender. Bowel sounds normal. No masses,  No organomegaly. Extremities: Extremities normal, atraumatic, no cyanosis or edema. Pulses: 2+ and symmetric all extremities. Skin: Skin color, texture, turgor normal. No rashes or lesions   Neurologic: CNII-XII intact.           EKG:  normal EKG sinus tachycardia          Data Review:     Recent Days:  Recent Labs     04/06/21  0447 04/05/21  0352 04/03/21  1534   WBC 3.8 4.2 9.9   HGB 7.0* 7.6* 9.2*   HCT 23.5* 24.6* 31.4*   * 173 275     Recent Labs     04/06/21  0447 04/05/21  0352 04/03/21  1534    137 134*   K 3.8 3.5 3.7    108 104   CO2 21 21 22   * 112* 132*   BUN 14 13 11 CREA 0.89 0.85 1.10*   CA 7.7* 7.9* 8.0*   ALB  --  3.0* 3.4*   TBILI  --  0.7 0.6   ALT  --  52 33   INR 1.1  --   --      No results for input(s): PH, PCO2, PO2, HCO3, FIO2 in the last 72 hours. 24 Hour Results:  Recent Results (from the past 24 hour(s))   TYPE & SCREEN    Collection Time: 04/06/21  4:46 AM   Result Value Ref Range    Crossmatch Expiration 04/09/2021,2359     ABO/Rh(D) O POSITIVE     Antibody screen NEG     Unit number [id number]     Blood component type RC LR     Unit division 00     Status of unit ISSUED     Crossmatch result Compatible    CBC WITH AUTOMATED DIFF    Collection Time: 04/06/21  4:47 AM   Result Value Ref Range    WBC 3.8 3.6 - 11.0 K/uL    RBC 3.20 (L) 3.80 - 5.20 M/uL    HGB 7.0 (L) 11.5 - 16.0 g/dL    HCT 23.5 (L) 35.0 - 47.0 %    MCV 73.4 (L) 80.0 - 99.0 FL    MCH 21.9 (L) 26.0 - 34.0 PG    MCHC 29.8 (L) 30.0 - 36.5 g/dL    RDW 18.1 (H) 11.5 - 14.5 %    PLATELET 351 (L) 797 - 400 K/uL    MPV 10.7 8.9 - 12.9 FL    NRBC 0.0 0  WBC    ABSOLUTE NRBC 0.00 0.00 - 0.01 K/uL    NEUTROPHILS 88 (H) 32 - 75 %    BAND NEUTROPHILS 7 (H) 0 - 6 %    LYMPHOCYTES 3 (L) 12 - 49 %    MONOCYTES 2 (L) 5 - 13 %    EOSINOPHILS 0 0 - 7 %    BASOPHILS 0 0 - 1 %    IMMATURE GRANULOCYTES 0 %    ABS. NEUTROPHILS 3.6 1.8 - 8.0 K/UL    ABS. LYMPHOCYTES 0.1 (L) 0.8 - 3.5 K/UL    ABS. MONOCYTES 0.1 0.0 - 1.0 K/UL    ABS. EOSINOPHILS 0.0 0.0 - 0.4 K/UL    ABS. BASOPHILS 0.0 0.0 - 0.1 K/UL    ABS. IMM.  GRANS. 0.0 K/UL    DF MANUAL      PLATELET COMMENTS Large Platelets      RBC COMMENTS ANISOCYTOSIS  1+        RBC COMMENTS OVALOCYTES  PRESENT        RBC COMMENTS KANU CELLS  PRESENT       METABOLIC PANEL, BASIC    Collection Time: 04/06/21  4:47 AM   Result Value Ref Range    Sodium 138 136 - 145 mmol/L    Potassium 3.8 3.5 - 5.1 mmol/L    Chloride 107 97 - 108 mmol/L    CO2 21 21 - 32 mmol/L    Anion gap 10 5 - 15 mmol/L    Glucose 103 (H) 65 - 100 mg/dL    BUN 14 6 - 20 MG/DL    Creatinine 0.89 0.55 - 1.02 MG/DL    BUN/Creatinine ratio 16 12 - 20      GFR est AA >60 >60 ml/min/1.73m2    GFR est non-AA >60 >60 ml/min/1.73m2    Calcium 7.7 (L) 8.5 - 10.1 MG/DL   PROTHROMBIN TIME + INR    Collection Time: 04/06/21  4:47 AM   Result Value Ref Range    INR 1.1 0.9 - 1.1      Prothrombin time 11.0 9.0 - 11.1 sec   TROPONIN I    Collection Time: 04/06/21  4:47 AM   Result Value Ref Range    Troponin-I, Qt. 0.56 (H) <0.05 ng/mL   SAMPLES BEING HELD    Collection Time: 04/06/21  4:47 AM   Result Value Ref Range    SAMPLES BEING HELD 1red     COMMENT        Add-on orders for these samples will be processed based on acceptable specimen integrity and analyte stability, which may vary by analyte. SARS-COV-2 AB, TOTAL    Collection Time: 04/06/21  4:47 AM   Result Value Ref Range    SARS-CoV-2 Ab, Total NONREACTIVE NR     GLUCOSE, POC    Collection Time: 04/06/21  5:08 AM   Result Value Ref Range    Glucose (POC) 110 (H) 65 - 100 mg/dL    Performed by Grace Saha    EKG, 12 LEAD, INITIAL    Collection Time: 04/06/21  5:10 AM   Result Value Ref Range    Ventricular Rate 96 BPM    Atrial Rate 96 BPM    P-R Interval 154 ms    QRS Duration 74 ms    Q-T Interval 344 ms    QTC Calculation (Bezet) 434 ms    Calculated P Axis 72 degrees    Calculated R Axis 60 degrees    Calculated T Axis 78 degrees    Diagnosis       Normal sinus rhythm  Low voltage QRS  When compared with ECG of 03-APR-2021 19:07,  MANUAL COMPARISON REQUIRED, DATA IS UNCONFIRMED     RBC, ALLOCATE    Collection Time: 04/06/21  6:30 AM   Result Value Ref Range    HISTORY CHECKED? Historical check performed    SAMPLES BEING HELD    Collection Time: 04/06/21  7:00 AM   Result Value Ref Range    SAMPLES BEING HELD 1PST     COMMENT        Add-on orders for these samples will be processed based on acceptable specimen integrity and analyte stability, which may vary by analyte. Imaging:     Jus Bridges Wo Cont    Result Date: 4/6/2021  1.  Numerous supratentorial and infratentorial acute infarcts as above, largest in the left superior cerebellum, most consistent with embolic etiology. Cta Code Neuro Head And Neck W Cont    Result Date: 4/6/2021  No evidence for acute large vessel arterial occlusion or significant stenosis. No evidence for ischemic penumbra. Small focal perfusion abnormality in the left cerebellum of uncertain significance corresponding to focal area of hypoattenuation in the left cerebellum. Consider further evaluation with MR of the brain     Ct Code Neuro Head Wo Contrast    Result Date: 4/6/2021  Areas of low-attenuation in both cerebellar hemispheres, left greater than right, may represent old infarcts, although underlying mass lesions cannot be excluded; consider further evaluation with contrast enhanced brain MRI. No evidence of acute infarct or intracranial hemorrhage. The findings were called to Dr. Maurice Braden on 4/6/2021 at 4:40 AM by Dr. La Alicia. [facility] [id number] Neuro Perf W Cbf    Result Date: 4/6/2021  No evidence for acute large vessel arterial occlusion or significant stenosis. No evidence for ischemic penumbra. Small focal perfusion abnormality in the left cerebellum of uncertain significance corresponding to focal area of hypoattenuation in the left cerebellum. Consider further evaluation with MR of the brain       Assessment:     Active Problems:    Dizziness (4/6/2021)           Plan:     1.   Dizziness POA CVA with multiple ischemic stroke  -Admit patient to telemetry neuro check every 4 hours  -Blood transfusion 1 unit given in the ED due to chronic blood loss anemia which could be contributing to dizziness as well  -CT CTA was done in the emergency room with no acute infarct but there was questionable cerebellar findings a MRI was ordered with contrast will follow up neurology consult  -MRI showing multiple strokes neurology consulted  -Ordered BRITTA and hypercoagulable work-up will follow up  -Continue with aspirin and statin blood culture x2 ordered    2. Chronic blood loss anemia due to main heavy menstrual bleed  -Blood transfusion 1 unit continue oral iron supplement  -Patient has a follow-up with OB/GYN as an outpatient at [place] next Thursday    3. Fever SARS Covid test PCR negative yesterday from [facility]  -If continues to have fever we will repeat that test  -No fever documented in the ED patient is on room air less likely Covid  -Chest x-ray 4/3 no acute pulmonary issue    4. Nausea vomiting diarrhea could be viral illness  -Give IV fluid check stool studies if continues to have diarrhea usually self-limiting    5. Elevated troponin probably demand supply mismatch from low hemoglobin  -Trend troponin every 8 hours 3 times    6.   DVT prophylaxis SCD GI prophylaxis not indicated ambulates at baseline full code discussed with the patient and mother at bedside       Signed By: Kenyon King MD     April 6, 2021

## 2021-04-06 NOTE — ED TRIAGE NOTES
Pt reports she has been dizzy since Friday. Pt reports she feels week all over and has had body aches Saturday. Pt mother reports the pt had some slurred speech and left sided facial droop at 1530. Pt mother reports the slurred speech went away at 2200.        Pt denies CP, SOB

## 2021-04-06 NOTE — PROGRESS NOTES
Admission Medication Reconciliation: In progress:    Unable to speak with patient face to face at this time due to general isolation precautions in the ED related to COVID-19 pandemic. Left voicemail for patient and her mother. Also called [facility] pharmacy in [facility] [address] MD ([phone])-SARITAORHDARÍO states that she has no active prescriptions. Awaiting return call from from Mother and will update PTA med list once current information becomes available. Thank you for allowing me to participate in the care of your patient. Phyllis VelasquezD, RN # [phone]       [facility] pharmacy benefit data reflects medications filled and processed through the patient's insurance, however   this data does NOT capture whether the medication was picked up or is currently being taken by the patient. Allergies:  Patient has no known allergies. Significant PMH/Disease States: History reviewed. No pertinent past medical history. Chief Complaint for this Admission:    Chief Complaint   Patient presents with    Other     Prior to Admission Medications:   None     Please contact the main inpatient pharmacy with any questions or concerns at [phone] and we will direct you to the clinical pharmacist covering this patient's care while in-house.    KEVAN Puri

## 2021-04-06 NOTE — CONSULTS
Neuro consult completed. Dictated note to follow. Pt with multifocal strokes, the largest in left cerebellum, small strokes in right cerebellum and bilateral cerebral hemispheres. Pt has been febrile with N/V since Friday, 4/2/21. Additionally, pt reports difficulty seeing \"out of right eye\", ataxia, right eye drooping. No h/o blood clots. No family h/o clotting d/o. Pt was also found to be anemic, Hgb 7.0, a chronic issue for her, related to heavy menses. Exam is unremarkable. Hypercoag panel pending. Cardiology consulted for BRITTA, concern for valve vegetation given fever, exclude thrombus. Start ASA for now. Blood cx x 2 ordered. Cancelled NSG consult.  Cancelled Hem/Onc consult, will reconsult if hypercoag panel is abnormal.

## 2021-04-07 ENCOUNTER — APPOINTMENT (OUTPATIENT)
Dept: NON INVASIVE DIAGNOSTICS | Age: 36
DRG: 871 | End: 2021-04-07
Attending: INTERNAL MEDICINE
Payer: COMMERCIAL

## 2021-04-07 PROBLEM — I63.9 STROKE (CEREBRUM) (HCC): Status: ACTIVE | Noted: 2021-04-07

## 2021-04-07 LAB
ABO + RH BLD: NORMAL
B2 GLYCOPROT1 IGA SER-ACNC: <9 GPI IGA UNITS (ref 0–25)
B2 GLYCOPROT1 IGG SER-ACNC: <9 GPI IGG UNITS (ref 0–20)
B2 GLYCOPROT1 IGM SER-ACNC: <9 GPI IGM UNITS (ref 0–32)
BLD PROD TYP BPU: NORMAL
BLOOD GROUP ANTIBODIES SERPL: NORMAL
BPU ID: NORMAL
CARDIOLIPIN IGA SER IA-ACNC: <9 APL U/ML (ref 0–11)
CARDIOLIPIN IGG SER IA-ACNC: <9 GPL U/ML (ref 0–14)
CARDIOLIPIN IGM SER IA-ACNC: <9 MPL U/ML (ref 0–12)
CROSSMATCH RESULT,%XM: NORMAL
ECHO AO ROOT DIAM: 3.2 CM
ECHO AV PEAK GRADIENT: 4.97 MMHG
ECHO AV PEAK VELOCITY: 111.46 CM/S
ECHO LA MAJOR AXIS: 2.94 CM
ECHO LA MINOR AXIS: 1.72 CM
ECHO LV E' LATERAL VELOCITY: 7.41 CM/S
ECHO LV E' SEPTAL VELOCITY: 12.5 CM/S
ECHO LV INTERNAL DIMENSION DIASTOLIC: 5.07 CM (ref 3.9–5.3)
ECHO LV INTERNAL DIMENSION SYSTOLIC: 3.65 CM
ECHO LV IVSD: 0.97 CM (ref 0.6–0.9)
ECHO LV MASS 2D: 186.2 G (ref 67–162)
ECHO LV MASS INDEX 2D: 109 G/M2 (ref 43–95)
ECHO LV POSTERIOR WALL DIASTOLIC: 1.03 CM (ref 0.6–0.9)
ECHO LVOT PEAK GRADIENT: 3.9 MMHG
ECHO LVOT PEAK VELOCITY: 98.78 CM/S
ECHO MV A VELOCITY: 49.89 CM/S
ECHO MV AREA PHT: 6.22 CM2
ECHO MV E DECELERATION TIME (DT): 121.99 MS
ECHO MV E VELOCITY: 62.87 CM/S
ECHO MV E/A RATIO: 1.26
ECHO MV E/E' LATERAL: 8.48
ECHO MV E/E' RATIO (AVERAGED): 6.76
ECHO MV E/E' SEPTAL: 5.03
ECHO MV PRESSURE HALF TIME (PHT): 35.38 MS
ECHO PV MAX VELOCITY: 102.57 CM/S
ECHO PV PEAK INSTANTANEOUS GRADIENT SYSTOLIC: 4.21 MMHG
ECHO RV TAPSE: 1.68 CM (ref 1.5–2)
ECHO TV REGURGITANT MAX VELOCITY: 208.74 CM/S
ECHO TV REGURGITANT PEAK GRADIENT: 17.43 MMHG
ERYTHROCYTE [DISTWIDTH] IN BLOOD BY AUTOMATED COUNT: 20 % (ref 11.5–14.5)
HCT VFR BLD AUTO: 27.3 % (ref 35–47)
HCT VFR BLD AUTO: 27.9 % (ref 35–47)
HGB BLD-MCNC: 8.5 G/DL (ref 11.5–16)
HGB BLD-MCNC: 8.6 G/DL (ref 11.5–16)
MCH RBC QN AUTO: 23.1 PG (ref 26–34)
MCHC RBC AUTO-ENTMCNC: 30.8 G/DL (ref 30–36.5)
MCV RBC AUTO: 74.8 FL (ref 80–99)
NRBC # BLD: 0 K/UL (ref 0–0.01)
NRBC BLD-RTO: 0 PER 100 WBC
PERIPHERAL SMEAR,PSM: NORMAL
PLATELET # BLD AUTO: 103 K/UL (ref 150–400)
RBC # BLD AUTO: 3.73 M/UL (ref 3.8–5.2)
SARS-COV-2, XPLCVT: NOT DETECTED
SOURCE, COVRS: NORMAL
SPECIMEN EXP DATE BLD: NORMAL
STATUS OF UNIT,%ST: NORMAL
UNIT DIVISION, %UDIV: 0
WBC # BLD AUTO: 5.1 K/UL (ref 3.6–11)

## 2021-04-07 PROCEDURE — P9045 ALBUMIN (HUMAN), 5%, 250 ML: HCPCS | Performed by: HOSPITALIST

## 2021-04-07 PROCEDURE — 87040 BLOOD CULTURE FOR BACTERIA: CPT

## 2021-04-07 PROCEDURE — 74011000258 HC RX REV CODE- 258: Performed by: INTERNAL MEDICINE

## 2021-04-07 PROCEDURE — 85027 COMPLETE CBC AUTOMATED: CPT

## 2021-04-07 PROCEDURE — 74011250636 HC RX REV CODE- 250/636: Performed by: INTERNAL MEDICINE

## 2021-04-07 PROCEDURE — 99233 SBSQ HOSP IP/OBS HIGH 50: CPT | Performed by: PSYCHIATRY & NEUROLOGY

## 2021-04-07 PROCEDURE — 36415 COLL VENOUS BLD VENIPUNCTURE: CPT

## 2021-04-07 PROCEDURE — 74011250637 HC RX REV CODE- 250/637: Performed by: HOSPITALIST

## 2021-04-07 PROCEDURE — 74011250637 HC RX REV CODE- 250/637: Performed by: PSYCHIATRY & NEUROLOGY

## 2021-04-07 PROCEDURE — 87147 CULTURE TYPE IMMUNOLOGIC: CPT

## 2021-04-07 PROCEDURE — 74011250637 HC RX REV CODE- 250/637: Performed by: NURSE PRACTITIONER

## 2021-04-07 PROCEDURE — 74011250636 HC RX REV CODE- 250/636: Performed by: NURSE PRACTITIONER

## 2021-04-07 PROCEDURE — 74011250636 HC RX REV CODE- 250/636: Performed by: HOSPITALIST

## 2021-04-07 PROCEDURE — 85018 HEMOGLOBIN: CPT

## 2021-04-07 PROCEDURE — 99218 HC RM OBSERVATION: CPT

## 2021-04-07 PROCEDURE — 96375 TX/PRO/DX INJ NEW DRUG ADDON: CPT

## 2021-04-07 PROCEDURE — 93306 TTE W/DOPPLER COMPLETE: CPT

## 2021-04-07 PROCEDURE — 99233 SBSQ HOSP IP/OBS HIGH 50: CPT | Performed by: INTERNAL MEDICINE

## 2021-04-07 PROCEDURE — 65660000000 HC RM CCU STEPDOWN

## 2021-04-07 RX ORDER — KETOROLAC TROMETHAMINE 30 MG/ML
15 INJECTION, SOLUTION INTRAMUSCULAR; INTRAVENOUS
Status: COMPLETED | OUTPATIENT
Start: 2021-04-07 | End: 2021-04-07

## 2021-04-07 RX ORDER — VANCOMYCIN/0.9 % SOD CHLORIDE 1.5G/250ML
1500 PLASTIC BAG, INJECTION (ML) INTRAVENOUS ONCE
Status: COMPLETED | OUTPATIENT
Start: 2021-04-07 | End: 2021-04-07

## 2021-04-07 RX ORDER — ALBUMIN HUMAN 50 G/1000ML
25 SOLUTION INTRAVENOUS EVERY 6 HOURS
Status: DISPENSED | OUTPATIENT
Start: 2021-04-07 | End: 2021-04-08

## 2021-04-07 RX ORDER — VANCOMYCIN HYDROCHLORIDE
1250 EVERY 24 HOURS
Status: DISCONTINUED | OUTPATIENT
Start: 2021-04-07 | End: 2021-04-07 | Stop reason: DRUGHIGH

## 2021-04-07 RX ADMIN — KETOROLAC TROMETHAMINE 15 MG: 30 INJECTION, SOLUTION INTRAMUSCULAR at 22:25

## 2021-04-07 RX ADMIN — ALBUMIN (HUMAN) 25 G: 12.5 INJECTION, SOLUTION INTRAVENOUS at 13:13

## 2021-04-07 RX ADMIN — ACETAMINOPHEN 650 MG: 325 TABLET, FILM COATED ORAL at 17:05

## 2021-04-07 RX ADMIN — VANCOMYCIN HYDROCHLORIDE 750 MG: 750 INJECTION, POWDER, LYOPHILIZED, FOR SOLUTION INTRAVENOUS at 22:03

## 2021-04-07 RX ADMIN — SODIUM CHLORIDE 100 ML/HR: 9 INJECTION, SOLUTION INTRAVENOUS at 11:25

## 2021-04-07 RX ADMIN — ALBUMIN (HUMAN) 25 G: 12.5 INJECTION, SOLUTION INTRAVENOUS at 17:06

## 2021-04-07 RX ADMIN — IRON SUCROSE 200 MG: 20 INJECTION, SOLUTION INTRAVENOUS at 11:25

## 2021-04-07 RX ADMIN — ACETAMINOPHEN 650 MG: 325 TABLET, FILM COATED ORAL at 00:09

## 2021-04-07 RX ADMIN — Medication 10 ML: at 06:56

## 2021-04-07 RX ADMIN — SODIUM CHLORIDE 100 ML/HR: 9 INJECTION, SOLUTION INTRAVENOUS at 00:11

## 2021-04-07 RX ADMIN — KETOROLAC TROMETHAMINE 15 MG: 30 INJECTION, SOLUTION INTRAMUSCULAR at 03:49

## 2021-04-07 RX ADMIN — Medication 10 ML: at 22:04

## 2021-04-07 RX ADMIN — ACETAMINOPHEN 650 MG: 325 TABLET, FILM COATED ORAL at 03:58

## 2021-04-07 RX ADMIN — Medication 10 ML: at 13:13

## 2021-04-07 RX ADMIN — ATORVASTATIN CALCIUM 40 MG: 40 TABLET, FILM COATED ORAL at 08:59

## 2021-04-07 RX ADMIN — ASPIRIN 81 MG: 81 TABLET, CHEWABLE ORAL at 08:59

## 2021-04-07 RX ADMIN — VANCOMYCIN HYDROCHLORIDE 1500 MG: 10 INJECTION, POWDER, LYOPHILIZED, FOR SOLUTION INTRAVENOUS at 09:00

## 2021-04-07 NOTE — PROGRESS NOTES
0000- Pt kept NPO per the order, except medications. 0005-Patient febrile, temp 102.6, Tylenol administered. Non therapeutic actions taken. Patient would not allow me to place ice packs on her in efforts to decrease temp. 6155- Pt still remains febrile/ Notified Hospitalist on call. Orders received to admin Tylenol and ordered Toradol IV Push. Meds given. Temps taken intermittently, still only yielding a temp no lower than 99.    0730- Pt effectively handed off this morning  to Lori Das RN for [facility].

## 2021-04-07 NOTE — CONSULTS
[address]    Name:  Sy Ba  MR#:  [id number]  :  1985  ACCOUNT #:  [de-identified]  DATE OF SERVICE:  2021      NEUROLOGY CONSULTATION    HISTORY OF PRESENT ILLNESS:  This is a 49-year-old right-handed female, who was admitted this morning due to imbalance and slurred speech with left facial droop. The patient is seen with her mother at the bedside, who aids in the history. The patient reports symptoms started on Friday, 21, had fever, nausea, and vomiting. She presented to the ED 4/3/21 had a blood pressure of 103/52, a pulse of 111, and a temperature of 103.1, work up did not lead to any obvious etiology including negative COVID testing and pt was discharged. She returned again on 21 with fever and vomiting, again no etiology found. Presented today after this episode of slurring of her speech and left facial droop. A CT of her head revealed questionable abnormality in the cerebellum and MRI of the brain subsequently confirmed bilateral cerebellar infarcts larger on the left than the right as well as bilateral cerebral punctate infarcts with a larger infarct in the right corona radiata. CTP revealed left cerebellar perfusion defect consistent with these findings on MRI, and CTA of the head and the neck did not show any abnormalities. The patient's hemoglobin had dropped from 9.2 on  to 7.0 today. The patient notes heavy menses and chronic difficulties with iron deficiency anemia. The patient has received a transfusion since the presentation. The patient's CRP is elevated at 12. 6. Her hemoglobin A1c is 5.5. Her LDL is 16. Her COVID testing again was negative. The patient denies prior history of stroke. No history of clotting disorder. No known stroke risk factors. No family history of clotting disorder.   The patient on subsequent questioning recalls that she was having difficulty seeing out of her right eye at onset, and she has felt off balance, had trouble walking, describing ataxia, and her right eye was drooping. The patient denies any hiccups. No numbness, tingling, or weakness in her extremities. PAST MEDICAL HISTORY:  1. Uterine fibroids and endometriosis. She has undergone a myomectomy in the past.  2.  Anemia secondary to uterine fibroids and endometriosis and heavy menses. REVIEW OF SYSTEMS:  All other systems are reviewed and are per past medical history or HPI, otherwise negative. MEDICATIONS AT HOME:  None. ALLERGIES:  NONE. SOCIAL HISTORY:  She lives in \[facility]"" alone, but since the pandemic she has been working from home for [employer] and currently staying with her mother in [place]. She denies any tobacco or drug use. Only a very occasional alcohol use. FAMILY HISTORY:  Mom with hypertension and tachycardia. Dad with hypertension. Brother, no health issues. She has maternal aunts and uncles with cancer. PHYSICAL EXAMINATION:  VITAL SIGNS:  Presentation blood pressure is 97/57, heart rate of 109, respiratory rate 18, saturating 100% on room air, temperature is 98.1. BMI of 21.2. GENERAL:  She is a well-nourished and well-developed, healthy-appearing female, sitting in bed in the emergency department, in no distress. HEART:  Tachycardic with regular rhythm. No murmur appreciated. Carotids are 2+. No bruits. EXTREMITIES:  Warm. No edema. She has 2+ radial pulses. NEUROLOGICAL EXAMINATION:  Mental Status:  She is alert and oriented x4. Speech and language intact. Attention, memory, and fund of knowledge appropriate. Cranial Nerves:  No facial asymmetry or ptosis. Extraocular eye movements intact without diplopia or nystagmus. Visual fields are full. Pupils equally round and reactive. Tongue midline. Palate elevates symmetrically. Trapezius and sternocleidomastoid are 5/5. Strength, sensation, and hearing intact. Motor Exam:  5/5 throughout. No pronator drift. No tremor. Sensory Exam:  Intact to light touch and pinprick. Reflexes are symmetric, 2+. Toes downgoing. Coordination:  Intact to finger-to-nose, heel-to-shin, and rapid alternating movements. Gait not assessed at this time. LABORATORY DATA:  Studies and reports reviewed above in the HPI. ASSESSMENT AND PLAN:  This is a 17-year-old right-handed female, presenting with a 3-day history of fever, nausea, and vomiting, presenting due to imbalance, dysarthria, left facial droop with the patient noting some difficulty seeing out of her right eye and right eye droop over the last three days that was transient, found to have bilateral cerebellar and cerebral infarcts, the largest in the left cerebellum and right corona radiata, noted to be febrile and had a hemoglobin of 7 related to heavy menses, history of uterine fibroids and iron deficiency anemia. Given her embolic stroke, cardioembolic etiology is concerning and with fever, valve vegetation/endocarditis is a consideration. Recommend hypercoagulable panel, TTE, cardiology has been consulted and ordered this stat. If TTecho is negative, she will need a transesophageal echocardiogram.  I have ordered blood cultures x2, started her on aspirin for now. Canceled Neurosurgery consult. Canceled the Hematology-Oncology consult. We will reconsult if her hypercoagulability panel is abnormal.  The patient will need close neuromonitoring and workup for fever of unknown etiology.       Dewey Darnell MD      [id number]  D:  04/07/2021 7:59  T:  04/07/2021 9:36  JOB #:  [id number]

## 2021-04-07 NOTE — PROGRESS NOTES
Pharmacist Note - Vancomycin Dosing    Consult provided for this 28 y.o. female for indication of GPC bacteremia. Antibiotic regimen(s): Vanc monotherapy  Patient on vancomycin PTA? NO     Recent Labs     21  0447 21  0352   WBC 3.8 4.2   CREA 0.89 0.85   BUN 14 13     Frequency of BMP: Daily through 4/10  Height: 170.2 cm  Weight: 61.3 kg  Est CrCl: ~85-90 ml/min  Temp (24hrs), Av.5 °F (38.1 °C), Min:98.9 °F (37.2 °C), Max:103.1 °F (39.5 °C)    Cultures:   Blood- GPCs , pending   Blood- in process    Goal trough = ~15 mcg/mL    Therapy will be initiated with a loading dose of 1500 mg IV x 1 to be followed by a maintenance dose of 750 mg IV every 12 hours. Pharmacy to follow patient daily and order levels / make dose adjustments as appropriate.     Guanaco Haskins, EvaD, BCPS

## 2021-04-07 NOTE — PROGRESS NOTES
[address] [facility]  Hospitalist Group                                                                                          Hospitalist Progress Note  Roman Tran MD  Answering service: [phone] from in house phone        Date of Service:  2021  NAME:  Altagracia Farris  :  1985  MRN:  [id number]      Admission Summary:   Altagracia Farris is a 28 y.o. female who presents with dizziness. History obtained from the patient and her mother at bedside. As per patient initially went to [facility] ED for fever chills for 3 days with nausea vomiting a Covid test was negative there and was sent home. Patient keep on feeling dizzy and came back to [facility] ER. Initial code CTA CT showed no acute issue but there was a probability of some findings in the cerebellum MRI was ordered. Patient also history of fibroid and heavy menstrual bleeding she has symptomatic anemia and has a follow-up appointment with OB/GYN next Thursday at [place]. She complained of fever and not associated with  any burning with urination completed her menstrual cycle earlier today and usually has been taking Tylenol to control her fevers.  Patient states she has had 3-4 episodes of diarrhea yesterday and earlier today (no blood) and she had several episodes of vomiting yesterday with an increased number of episodes of vomiting today  Admitted for further management    Interval history / Subjective:   Patient seen and examined discussed with RN and patient's family at bedside  Gram-positive cocci bacteremia 4 x 4 bottle-with multiple infarcts supra and infratentorial by MRI  Possibly patient has a infective endocarditis  Starting vancomycin work-up in progress hypercoagulable work-up in progress  ID and hematology consult pending bone marrow suppression as well reticulocyte count almost 0  We will follow up     Assessment & Plan:     1.   Dizziness POA from CVA with multiple ischemic stroke  -Admit patient to telemetry neuro check every 4 hours  -Blood transfusion 1 unit given in the ED repeat H&H due to chronic blood loss anemia which could be contributing to dizziness as well  -CT CTA was done in the emergency room with no acute infarct but there was questionable cerebellar findings a MRI was ordered with contrast will follow up neurology consult  -MRI showing multiple strokes neurology consulted  -Ordered BRITTA and hypercoagulable work-up will follow up  -Continue with aspirin and statin blood culture x2 ordered    2. Bacteremia with gram-positive cocci 4 x 4 bottles  Possibly infective endocarditis start vancomycin pharmacy to dose  BRITTA requested cardiology following  -Possible development of volume overload versus heart failure as well proBNP in 4500  Echo pending   Blood pressure low will support with IV albumin for now    3. Chronic blood loss anemia due to main heavy menstrual bleed  -Blood transfusion 1 unit continue oral iron supplement  -Patient has a follow-up with OB/GYN as an outpatient at [place] next Thursday    4. Fever SARS Covid test PCR negative yesterday from [facility]  -Repeat test pending possibly related to bacteremia and infective endocarditis suspecting  -Chest x-ray 4/3 no acute pulmonary issue blood culture positive    5. Nausea vomiting diarrhea could be viral illness-supportive care    6. Elevated troponin probably demand supply mismatch from low hemoglobin  -Trend troponin every 8 hours 3 times cardiology on board    7.   Very low reticulocyte count possible bone marrow suppression as well consulted hematology oncology    Code status:full  DVT prophylaxis: scd    Care Plan discussed with: Patient/Family and Nurse  Anticipated Disposition: Home w/Family  Anticipated Discharge: 24 hours to 48 hours   Critically ill patient may deteriorate discussed with family     Hospital Problems  Never Reviewed          Codes Class Noted POA    Dizziness ICD-10-CM: R42  ICD-9-CM: 780.4  4/6/2021 Unknown                Review of Systems:   A comprehensive review of systems was negative. Vital Signs:    Last 24hrs VS reviewed since prior progress note. Most recent are:  Visit Vitals  BP 93/61 (BP 1 Location: Left upper arm, BP Patient Position: At rest)   Pulse 92   Temp 99.1 °F (37.3 °C)   Resp 21   Wt 61.3 kg (135 lb 3.2 oz)   SpO2 100%   BMI 21.18 kg/m²         Intake/Output Summary (Last 24 hours) at 4/7/2021 0816  Last data filed at 4/6/2021 1816  Gross per 24 hour   Intake 975 ml   Output    Net 975 ml        Physical Examination:     I had a face to face encounter with this patient and independently examined them on 4/7/2021 as outlined below:          Constitutional:  No acute distress, cooperative, pleasant    ENT:  Oral mucosa moist, oropharynx benign. Resp:  CTA bilaterally. No wheezing/rhonchi/rales. No accessory muscle use   CV:  Regular rhythm, normal rate, no murmurs, gallops, rubs    GI:  Soft, non distended, non tender. normoactive bowel sounds, no hepatosplenomegaly     Musculoskeletal:  No edema, warm, 2+ pulses throughout    Neurologic:  Moves all extremities. AAOx3, CN II-XII reviewed     Psych:  Good insight, Not anxious nor agitated. Data Review:    I personally reviewed  Image and labs    Mri Brain W Wo Cont    Result Date: 4/6/2021  1. Numerous supratentorial and infratentorial acute infarcts as above, largest in the left superior cerebellum, most consistent with embolic etiology. Cta Code Neuro Head And Neck W Cont    Result Date: 4/6/2021  No evidence for acute large vessel arterial occlusion or significant stenosis. No evidence for ischemic penumbra. Small focal perfusion abnormality in the left cerebellum of uncertain significance corresponding to focal area of hypoattenuation in the left cerebellum.  Consider further evaluation with MR of the brain     Ct Code Neuro Head Wo Contrast    Result Date: 4/6/2021  Areas of low-attenuation in both cerebellar hemispheres, left greater than right, may represent old infarcts, although underlying mass lesions cannot be excluded; consider further evaluation with contrast enhanced brain MRI. No evidence of acute infarct or intracranial hemorrhage. The findings were called to Dr. Marylee Lauber on 4/6/2021 at 4:40 AM by Dr. Asmita Leal. [place] [id number] Neuro Perf W Cbf    Result Date: 4/6/2021  No evidence for acute large vessel arterial occlusion or significant stenosis. No evidence for ischemic penumbra. Small focal perfusion abnormality in the left cerebellum of uncertain significance corresponding to focal area of hypoattenuation in the left cerebellum. Consider further evaluation with MR of the brain     Labs:     Recent Labs     04/06/21 0447 04/05/21  0352   WBC 3.8 4.2   HGB 7.0* 7.6*   HCT 23.5* 24.6*   * 173     Recent Labs     04/06/21 0447 04/05/21  0352    137   K 3.8 3.5    108   CO2 21 21   BUN 14 13   CREA 0.89 0.85   * 112*   CA 7.7* 7.9*     Recent Labs     04/06/21  1526 04/05/21  0352   ALT 72 52   AP 85 58   TBILI 1.0 0.7   TP 6.4 6.5   ALB 2.8* 3.0*   GLOB 3.6 3.5   LPSE  --  410*     Recent Labs     04/06/21 0447   INR 1.1   PTP 11.0      Recent Labs     04/06/21  1526   TIBC 303   PSAT Cannot be calculated      No results found for: FOL, RBCF   No results for input(s): PH, PCO2, PO2 in the last 72 hours.   Recent Labs     04/06/21  1526 04/06/21  0833 04/06/21  0447   TROIQ 0.61* 0.47* 0.56*     Lab Results   Component Value Date/Time    Cholesterol, total 68 04/06/2021 08:33 AM    HDL Cholesterol 17 04/06/2021 08:33 AM    LDL, calculated 16.6 04/06/2021 08:33 AM    Triglyceride 172 (H) 04/06/2021 08:33 AM    CHOL/HDL Ratio 4.0 04/06/2021 08:33 AM     Lab Results   Component Value Date/Time    Glucose (POC) 110 (H) 04/06/2021 05:08 AM     Lab Results   Component Value Date/Time    Color YELLOW/STRAW 04/05/2021 05:19 AM    Appearance CLEAR 04/05/2021 05:19 AM    Specific gravity 1.025 04/05/2021 05:19 AM    pH (UA) 5.5 04/05/2021 05:19 AM    Protein 100 (A) 04/05/2021 05:19 AM    Glucose Negative 04/05/2021 05:19 AM    Ketone Negative 04/05/2021 05:19 AM    Urobilinogen 1.0 04/05/2021 05:19 AM    Nitrites Negative 04/05/2021 05:19 AM    Leukocyte Esterase Negative 04/05/2021 05:19 AM    Epithelial cells MODERATE (A) 04/05/2021 05:19 AM    Bacteria 1+ (A) 04/05/2021 05:19 AM    WBC 5-10 04/05/2021 05:19 AM    RBC 0-5 04/05/2021 05:19 AM         Medications Reviewed:     Current Facility-Administered Medications   Medication Dose Route Frequency    vancomycin (VANCOCIN) 1250 mg in  ml infusion  1,250 mg IntraVENous Q24H    albumin human 5% (BUMINATE) solution 25 g  25 g IntraVENous Q6H    0.9% sodium chloride infusion 250 mL  250 mL IntraVENous PRN    sodium chloride (NS) flush 5-40 mL  5-40 mL IntraVENous Q8H    sodium chloride (NS) flush 5-40 mL  5-40 mL IntraVENous PRN    0.9% sodium chloride infusion  100 mL/hr IntraVENous CONTINUOUS    atorvastatin (LIPITOR) tablet 40 mg  40 mg Oral DAILY    acetaminophen (TYLENOL) tablet 650 mg  650 mg Oral Q4H PRN    aspirin chewable tablet 81 mg  81 mg Oral DAILY     ______________________________________________________________________  EXPECTED LENGTH OF STAY: - - -  ACTUAL LENGTH OF STAY:          0                 Galina Farooq MD

## 2021-04-07 NOTE — PROGRESS NOTES
Physical Therapy  After reviewing the chart, noted continues to be worked up for embolic strokes. Will hold at this time and follow up after further work up and medically stable for evaluation.   Faiza Adams, PT

## 2021-04-07 NOTE — PROGRESS NOTES
Problem: Falls - Risk of  Goal: *Absence of Falls  Description: Document Yosvany Chris Fall Risk and appropriate interventions in the flowsheet.   Outcome: Progressing Towards Goal  Note: Fall Risk Interventions:  Mobility Interventions: Communicate number of staff needed for ambulation/transfer, Patient to call before getting OOB         Medication Interventions: Patient to call before getting OOB, Teach patient to arise slowly    Elimination Interventions: Call light in reach, Patient to call for help with toileting needs, Toileting schedule/hourly rounds

## 2021-04-07 NOTE — PROGRESS NOTES
Neurology Progress Note     NAME: Efraín Coronel   :  1985   MRN:  [id number]   DATE:  2021    Assessment:     Active Problems:    Dizziness (2021)      Stroke (cerebrum) ([facility] [address] (2021)      Pt is a 29yo RH female, presenting 21 with a 3-day history of fever to 103, N/V, and transient sxs of ataxia, dysarthria, left facial droop, difficulty with vision, and right eye droop, found to have bilateral cerebellar and cerebral infarcts, the largest in the left cerebellum and right corona radiata on MRI. Hgb was 7 with h/o chronic difficulties with iron def anemia related to fibroids and heavy menses. Blood cx were positive x 4 bottles. Iron level low. ESR 50. Hematology consulted due to retic count 0.2. Exam pt alert and appropriate, non-focal.     Cardioembolic etiology thomboemolism vs septic emboli due to valve vegetation/endocarditis  Plan:   -Stat echo still pending.   -Continue ASA for now. Subjective:   Pt is seen with her mother on the phone. Pt c/o diffuse myalgias. Hurts to move. She also complains b/c she has been NPO since presenting to the ED for unclear reasons. COVID is being ruled out one more time.      Objective:   Chart reviewed since last seen    Current Facility-Administered Medications   Medication Dose Route Frequency    albumin human 5% (BUMINATE) solution 25 g  25 g IntraVENous Q6H    0.9% sodium chloride infusion 250 mL  250 mL IntraVENous PRN    sodium chloride (NS) flush 5-40 mL  5-40 mL IntraVENous Q8H    sodium chloride (NS) flush 5-40 mL  5-40 mL IntraVENous PRN    0.9% sodium chloride infusion  100 mL/hr IntraVENous CONTINUOUS    atorvastatin (LIPITOR) tablet 40 mg  40 mg Oral DAILY    acetaminophen (TYLENOL) tablet 650 mg  650 mg Oral Q4H PRN    aspirin chewable tablet 81 mg  81 mg Oral DAILY       Visit Vitals  /67 (BP 1 Location: Left upper arm, BP Patient Position: At rest)   Pulse 95   Temp 98.9 °F (37.2 °C)   Resp 22   Wt 135 lb 3.2 oz (61.3 kg)   SpO2 100%   BMI 21.18 kg/m²     Temp (24hrs), Av.5 °F (38.1 °C), Min:98.9 °F (37.2 °C), Max:103.1 °F (39.5 °C)      701 - 1900  In: 498.3 [I.V.:498.3]  Out: -    190 -  07  In: 1075 [I.V.:1075]  Out: -       Physical Exam:  General: Well developed well nourished patient in no apparent distress. Cardiac: Regular rate and rhythm with no murmurs. Extremities: 2+ Radial pulses, no cyanosis or edema    Neurological Exam:  Mental Status: Oriented to time, place and person. Speech and language intact. Attention and fund of knowledge appropriate. Normal recent and remote memory. Cranial Nerves:   EOMI, no nystagmus, no ptosis. Facial movement is symmetric. Hearing is intact. Motor:  5/5 strength in upper and lower proximal and distal muscles. No tremors   Reflexes:      Sensory:      Gait:     Cerebellar:           Lab Review   Recent Results (from the past 24 hour(s))   CULTURE, BLOOD, PAIRED    Collection Time: 21  3:12 PM    Specimen: Blood   Result Value Ref Range    Special Requests: NO SPECIAL REQUESTS      Culture result: (A)       GRAM POSITIVE COCCI IN CLUSTERS GROWING IN ALL 4 BOTTLES DRAWN NO SITES INDICATED   RETICULOCYTE COUNT    Collection Time: 21  3:26 PM   Result Value Ref Range    Reticulocyte count 0.2 (L) 0.7 - 2.1 %    Absolute Retic Cnt. 0.0090 (L) 0.0164 - 0.0776 M/ul   PERIPHERAL SMEAR    Collection Time: 21  3:26 PM   Result Value Ref Range    PERIPHERAL SMEAR        Pathologic examination results can be viewed in [facility] Care Chart Review under the Pathology tab.    IRON PROFILE    Collection Time: 21  3:26 PM   Result Value Ref Range    Iron <5 (L) 35 - 150 ug/dL    TIBC 303 250 - 450 ug/dL    Iron % saturation Cannot be calculated 20 - 50 %   HEPATIC FUNCTION PANEL    Collection Time: 04/06/21  3:26 PM   Result Value Ref Range    Protein, total 6.4 6.4 - 8.2 g/dL    Albumin 2.8 (L) 3.5 - 5.0 g/dL    Globulin 3.6 2.0 - 4.0 g/dL    A-G Ratio 0.8 (L) 1.1 - 2.2      Bilirubin, total 1.0 0.2 - 1.0 MG/DL    Bilirubin, direct 0.6 (H) 0.0 - 0.2 MG/DL    Alk. phosphatase 85 45 - 117 U/L    AST (SGOT) 125 (H) 15 - 37 U/L    ALT (SGPT) 72 12 - 78 U/L   HAPTOGLOBIN    Collection Time: 04/06/21  3:26 PM   Result Value Ref Range    Haptoglobin 276 (H) 30 - 200 mg/dL   FIBRINOGEN    Collection Time: 04/06/21  3:26 PM   Result Value Ref Range    Fibrinogen 625 (H) 200 - 475 mg/dL   FACTOR VIII    Collection Time: 04/06/21  3:26 PM   Result Value Ref Range    Factor VIII 239 (H) 80 - 200 %   LD    Collection Time: 04/06/21  3:26 PM   Result Value Ref Range     (H) 81 - 246 U/L   NT-PRO BNP    Collection Time: 04/06/21  3:26 PM   Result Value Ref Range    NT pro-BNP 4,918 (H) <125 PG/ML   T4, FREE    Collection Time: 04/06/21  3:26 PM   Result Value Ref Range    T4, Free 1.4 0.8 - 1.5 NG/DL   TROPONIN I    Collection Time: 04/06/21  3:26 PM   Result Value Ref Range    Troponin-I, Qt. 0.61 (H) <0.05 ng/mL   TSH 3RD GENERATION    Collection Time: 04/06/21  3:26 PM   Result Value Ref Range    TSH 0.47 0.36 - 3.74 uIU/mL   SED RATE (ESR)    Collection Time: 04/06/21  3:26 PM   Result Value Ref Range    Sed rate, automated 50 (H) 0 - 20 mm/hr   SARS-COV-2    Collection Time: 04/06/21  6:30 PM   Result Value Ref Range    SARS-CoV-2 Please find results under separate order     HGB & HCT    Collection Time: 04/07/21  9:10 AM   Result Value Ref Range    HGB 8.5 (L) 11.5 - 16.0 g/dL    HCT 27.3 (L) 35.0 - 47.0 %       Additional comments:  I have reviewed the patient's new clinical lab test results. I have personally reviewed the patient's radiographs.   MRI  MRI Results (most recent):  Results from [place] encounter on 04/06/21   MRI BRAIN W WO CONT    Narrative EXAM:  MRI BRAIN W WO CONT    INDICATION:    cerebellar mass lesion    COMPARISON:  CTA head and neck 4/6/2021. CONTRAST: 12 ml Dotarem. TECHNIQUE:    Multiplanar multisequence acquisition without and with contrast of the brain. FINDINGS:  Moderate-sized acute infarcts in the left superior cerebellum, and multiple  smaller acute infarcts in the right mid and inferior cerebellum. There are  additional numerous punctate to small acute infarcts scattered throughout the  cerebral hemispheres, largest in the right corona radiata. There is small amount  of petechial hemorrhage noted associated with the acute cerebellar infarcts. The ventricles are normal in size and position. There is no extra-axial fluid  collection or mass effect. There is no cerebellar tonsillar herniation. Expected  arterial flow-voids are present. No evidence of abnormal enhancement. Scattered mucosal thickening in the left frontal sinus, bilateral ethmoidal air  cells, and left maxillary sinus without air-fluid level. The mastoid air cells  and middle ears are clear. The orbital contents are within normal limits. No  significant osseous or scalp lesions are identified. Impression 1. Numerous supratentorial and infratentorial acute infarcts as above, largest  in the left superior cerebellum, most consistent with embolic etiology. CT Results (most recent):  Results from Hospital Encounter encounter on 04/06/21   CT CODE NEURO PERF W CBF    Narrative *PRELIMINARY REPORT*    No significant perfusion defect. No acute thrombosis or significant intracranial  stenosis. Preliminary report was provided by Dr. Ada Pickering, the on-call radiologist, at 5:11  AM.    Final report to follow.     *END PRELIMINARY REPORT*    FINAL REPORT:    CLINICAL HISTORY: Slurred speech    EXAMINATION:  CT ANGIOGRAPHY HEAD AND NECK     COMPARISON: CT head 4/6/2021    TECHNIQUE:  Following the uneventful administration of iodinated contrast  material, axial CT angiography of the head and neck was performed. Delayed axial  images through the head were also obtained. Coronal and sagittal reconstructions  were obtained. Manual postprocessing of images was performed. 3-D  Sagittal  maximal intensity projection images were obtained. 3-D Coronal maximal  intensity projections were obtained. CT dose reduction was achieved through use  of a standardized protocol tailored for this examination and automatic exposure  control for dose modulation. CT perfusion analysis was performed using CT with  contrast administration, including postprocessing of parametric maps with the  determination of cerebral blood flow, cerebral blood volume, and mean transit  time. This study was analyzed by the [address] [facility] [address] ai algorithm    FINDINGS:    Delayed contrast-enhanced head CT:    The ventricles are midline without hydrocephalus. There is no acute intra or  extra-axial hemorrhage. Focal hypoattenuation in the left cerebellum. The basal  cisterns are clear. The paranasal sinuses are clear. CTA NECK:    Great vessels: Normal arch anatomy with the origins patent. Right subclavian artery: Patent    Left subclavian artery: Patent    Right common carotid artery: Patent    Left common carotid artery: Patent    Cervical right internal carotid artery: Patent with no significant stenosis by  NASCET criteria. Cervical left internal carotid artery: Patent with no significant stenosis by  NASCET criteria.     Right vertebral artery: Patent    Left vertebral artery: Patent    Mild C5-6 degenerative disc disease    Subcentimeter low-density left thyroid nodule    CTA HEAD:    Right cavernous internal carotid artery: Patent    Left cavernous internal carotid artery: Patent    Anterior cerebral arteries: Patent    Anterior communicating artery: Patent    Right middle cerebral artery: Patent    Left middle cerebral artery: Patent    Posterior communicating arteries: Patent    Posterior cerebral arteries: Patent    Basilar artery: Patent    Distal vertebral arteries: Patent    CT perfusion brain: No evidence for ischemic penumbra. Small focal perfusion  abnormality in the left cerebellum of uncertain significance corresponding to  focal area of hypoattenuation in the left cerebellum. Measurements use NASCET criteria. Impression No evidence for acute large vessel arterial occlusion or significant stenosis. No evidence for ischemic penumbra. Small focal perfusion abnormality in the left  cerebellum of uncertain significance corresponding to focal area of  hypoattenuation in the left cerebellum. Consider further evaluation with MR of  the brain                 Care Plan discussed with:  Patient x   Family x   RN    Care Manager    Consultant/Specialist:  x     Signed: Alize Villalta MD

## 2021-04-07 NOTE — CONSULTS
Reason for Consult: [id number]/ BM supression vs other aplastic anemia? Did you call or speak to the consulting provider? No    Consult To [facility]    Schedule When? TODAY          Patient seen, chart reviewed, note dictated. [id number]    27 y/o woman admitted with fever/nausea/weakness, found to have 4/4 bottles positive for GPC and multiple CNS lesions concerning for infective endocarditis. Started on Abx, seen by Neurology, echo pending. Asked to see for anemia. Has h/o fibroids with very low iron indices and elevated ESR. 1. Anemia: multifactorial, likely due to iron deficiency as well as anemia of chronic inflammation. Will give IV venofer today and await results of echo. Patient denies IV drug use. Thank you for consult.      Mireya Tolbert MD  Hem/Onc

## 2021-04-07 NOTE — PROGRESS NOTES
Addendum    Events noted including stopping of venofer infusion by the ID ARNP due to the their concern for an increased risk of infection with the use of IV iron. I think it is important not to conflate the increased risk of infection we see in our hemochromatosis patient and cirrhosis patients with iron overload with supplement iron in patients who are iron deficient. Please see the [place] review who came up with this conclusion: [facility] Clin Proc 1/2015 15(0)[id number], a copy of which has been given to the patient and placed in the chart) \"Intravenous iron therapy is not associated with an increased risk of SAEs or infections\". Please also see a related trial in [facility] Nephrology 2019 20:327 Intravenous iron supplementation does not increase infectious disease risk in hemodialysis patients: a nationwide cohort-based case-crossover study. \"We found that intravenous iron supplementation did not increase short term infection risk amount HD patients\". Defer management of iron deficiency to the primary service.      Kristi Fall MD  Hem/Onc  [phone]

## 2021-04-07 NOTE — PROGRESS NOTES
[facility] [place]: Faby Decreraúl  [phone]  Requesting/referring provider:   Reason for Consult:    HPI: Nilsa Bone, a 28y.o. year-old who presents for evaluation of dizziness. Has PMH of anemia due to heavy periods. Developed fevers, chills, N&V on Friday, also had dizziness. Went to [facility] ED , covid test negative and went home. Dizziness continued, she had near syncopal episode on Friday but did not lose consciousness. She returned to ER on 4/5/21 with vomiting, fevers, dizziness and malaise. Was evaluated and discharged home. Returned to ER today with dizziness, slurred speech, altered gait as well as vision disturbances. States last night she developed slurred speech and vision disturbance (seeing white spots). No SOB, chest pain. No history of palpitations. She returned to ER where she had MRI brain which showed numerous supratentorial and infratential acute infarcts most consistent with embolic etiology. Cardiology has been consulted for BRITTA. Additional evaluation shows mild troponin elevation with flat trend and anemia, hgb 7. She has exhibited fevers. She denies any history of arrhythmias but reports that her pcp told her she had irregular heart beat. No history of cardiac testing. SH: never smoker, occasional glass of wine. Denies IV drug use. Takes B12 po and oregano. Works for [employer] at [employer], [employer]  FH: Mother HTN, tachycardia. Father HTN. Maternal grandfather had MI at age 37. Reviewed her presentation, afib, emboli etc. Needs echo asap to help determine cardiac status. hypercoag and infectious workups underway. Comfortable, in NAD, seems a bit tired/sleepy but no chest pain, no dyspnea now. Agreeable to current plans. This morning she is more awake little bit anxious about the findings of blood-borne infection. We reviewed that she will need to proceed with a BRITTA given her positive blood cultures for gram-positive cocci.   There is no obvious nidus of infection. She has 1 small shaving neck on her left leg but it does not appear to be infected. She has no chronic wounds no unexplained joint pain etc.  We will await the input of neurology hematology and infectious disease. And proceed with BRITTA at this point. She needs to go ahead and have an echo now while we are waiting for the BRITTA to occur    She remains febrile and is on antibiotics. She looks tired and weak her mild tachycardia is a sinus tach she has hypotension and will continue with supportive care. Assessment/Plan: 1. Acute CVA, multiple   -concerning for embolic cva.   -Neurology following. Hypercoagulable workup in process. -Will obtain stat TTE, if unremarkable, no vegetation or thrombus noted, will proceed with BRITTA on Thursday afternoon with Dr. Rahul George.    -Continue telemetry for now. 2.Elevated troponin   -0.56-0.47, flat trend   -EKG with no ischemic findings.   -no chest pain or exertional symptoms   -Troponin elevation of uncertain etiology, may be due to demand of CVA but will monitor. Follow up echo. 3. Fevers   -Blood cultures pending   -further workup per primary team   -Covid PCR negative. .    4. Anemia,    -likely blood loss due to heavy menses. -PRBCs were transfused, 1 unit. She  has no past medical history on file. Cardiovascular ROS: no chest pain or dyspnea on exertion, no palpitations  Respiratory ROS: no cough, shortness of breath, or wheezing  Neurological ROS: +dizziness, had slurred speech (resolved), abnormal gait   General: fatigue, body aches. All other systems negative except as above. PE  Vitals:    04/07/21 0317 04/07/21 0406 04/07/21 0500 04/07/21 0717   BP: 104/66   93/61   Pulse: (!) 105   92   Resp: 29   21   Temp: (!) 101.4 °F (38.6 °C)  99.4 °F (37.4 °C) 99.1 °F (37.3 °C)   SpO2: 100%   100%   Weight:  135 lb 3.2 oz (61.3 kg)      Body mass index is 21.18 kg/m².    General appearance - alert, well appearing, and in no distress  Mental status - affect appropriate to mood  Eyes - sclera anicteric, moist mucous membranes  Neck - supple, no significant adenopathy  Lymphatics - no  lymphadenopathy  Chest - clear to auscultation, no wheezes, rales or rhonchi  Heart - normal rate, regular rhythm, normal S1, S2, no murmurs, rubs, clicks or gallops  Abdomen - soft, nontender, nondistended, no masses or organomegaly  Back exam - symmetric  Neurological -CUELLO,  no focal deficit  Musculoskeletal - no muscular tenderness noted, normal strength  Extremities - peripheral pulses normal, no pedal edema  Skin - normal coloration  no rashes    Recent Labs:  Lab Results   Component Value Date/Time    Cholesterol, total 68 04/06/2021 08:33 AM    HDL Cholesterol 17 04/06/2021 08:33 AM    LDL, calculated 16.6 04/06/2021 08:33 AM    Triglyceride 172 (H) 04/06/2021 08:33 AM    CHOL/HDL Ratio 4.0 04/06/2021 08:33 AM     Lab Results   Component Value Date/Time    Creatinine 0.89 04/06/2021 04:47 AM     Lab Results   Component Value Date/Time    BUN 14 04/06/2021 04:47 AM     Lab Results   Component Value Date/Time    Potassium 3.8 04/06/2021 04:47 AM     Lab Results   Component Value Date/Time    Hemoglobin A1c 5.5 04/06/2021 08:33 AM     Lab Results   Component Value Date/Time    HGB 7.0 (L) 04/06/2021 04:47 AM     Lab Results   Component Value Date/Time    PLATELET 798 (L) 37/78/6150 04:47 AM       Reviewed:  History reviewed. No pertinent past medical history.   Social History     Tobacco Use   Smoking Status Not on file     Social History     Substance and Sexual Activity   Alcohol Use None     No Known Allergies    Current Facility-Administered Medications   Medication Dose Route Frequency    albumin human 5% (BUMINATE) solution 25 g  25 g IntraVENous Q6H    vancomycin (VANCOCIN) 1500 mg in  ml infusion  1,500 mg IntraVENous ONCE    iron sucrose (VENOFER) 200 mg in 0.9% sodium chloride 100 mL IVPB  200 mg IntraVENous ONCE    0.9% sodium chloride infusion 250 mL  250 mL IntraVENous PRN    sodium chloride (NS) flush 5-40 mL  5-40 mL IntraVENous Q8H    sodium chloride (NS) flush 5-40 mL  5-40 mL IntraVENous PRN    0.9% sodium chloride infusion  100 mL/hr IntraVENous CONTINUOUS    atorvastatin (LIPITOR) tablet 40 mg  40 mg Oral DAILY    acetaminophen (TYLENOL) tablet 650 mg  650 mg Oral Q4H PRN    aspirin chewable tablet 81 mg  81 mg Oral DAILY       Lisa Kellogg MD  [facility] heart and V[facility] [place]  [address] [facility] [address]  [facility], [address]

## 2021-04-07 NOTE — CONSULTS
Infectious Disease Consult    Today's Date: 4/7/2021   Admit Date: 4/6/2021    Impression:   GPC bacteremia  Fever  - blood cx (4/6) 4/4 bottles grew GPC; identification and susceptibility pending    Blood cx (4/7) pending    Normal WBC, T-max 102.6    CRP 22.6, procalcitonin 6.25, ->309    COVID 19 testing pending    Echo to r/o endocarditis - cardiology folowing    Acute CVA, multiple embolic event  - neurology following; hypercoagulable work up in process    Anemia; multifactorial   Iron deficient anemia  Hx of menorrhagia  - heme/onc following; s/p one unit of PRBC     IV venofer was ordered, was started as I seeing the pt; asked nursing staff to stop the infusion. Plan:   · Iron transfusion was interrupted; the role of iron in bacterial growth and the pathophysiology of cellular immunity create legitimate, there's some concerns that active infection may be exacerbated by the administration of intravenous iron. · Continue with IV vancomycin  · Echo to r/o vegetation  · Fever work up if temp >= 100.4    Anti-infectives:   · IV vancomycin 4/6 -    Subjective:   Date of Consultation:  April 7, 2021  Referring Physician: Verna Santizo MD    Patient is a 28 y.o. female presented to ER on 4/3 with fever and vomiting. COVID 19 was negative, CXR revealed no ASDZ. Pt received IV hydration and antipyretic then discharged to home. Pt returned to ER on 4/5 with fever, chills, nausea, and vomiting. Once again, pt was discharged to home once her symptoms have resolved. Pt returned to ER on 4/6 with new onset of facial droop, loose of balance, and slurred speech. She was still running a fever, nausea, vomiting, and having loose stools. Pt has a history of fibroids and have been suffering from heavy menstrual cycle, was planning to be seen by her GYN. Code vi was activated it. CTA of head revealed No evidence for ischemic penumbra.  Small focal perfusion abnormality in the left  cerebellum of uncertain significance corresponding to focal area of hypoattenuation in the left cerebellum. Following MRI of brain revealed numerous supratentorial and infratentorial acute infarcts as above, largest  in the left superior cerebellum, most consistent with embolic etiology. COVID 19 PCR remain negative. Pt was found to have anemia received one unit of PRBC and IV venofer. Pt's medical hx include uterine fibroids and endometriosis, s/p myomectomy in the past. Hx of sickle cell trait in pt's maternal side. She usually lives in [facility] alone, but since pandemic she has been working from home for [employer] and staying with her mother in [facility]. No hx of tobacco or substance abuse, occasional alcohol intake  No medication allergy    ID team was consulted for bacteremia management. Patient Active Problem List   Diagnosis Code    Dizziness R42    Stroke (cerebrum) ([facility] [address] I63.9     History reviewed. No pertinent past medical history. History reviewed. No pertinent family history. Social History     Tobacco Use    Smoking status: Not on file   Substance Use Topics    Alcohol use: Not on file     No past surgical history on file. Prior to Admission medications    Not on File     a  No Known Allergies     REVIEW OF SYSTEMS:     Total of 12 systems reviewed as follows:   I am not able to complete the review of systems because:    The patient is intubated and sedated    The patient has altered mental status due to his acute medical problems    The patient has baseline aphasia from prior stroke(s)    The patient has baseline dementia and is not reliable historian                 POSITIVE= underlined text  Negative = text not underlined  General:  fever, chills, sweats, generalized weakness, weight loss/gain,      loss of appetite   Eyes:    blurred vision, eye pain, loss of vision, double vision  ENT:    rhinorrhea, pharyngitis   Respiratory:   cough, sputum production, SOB, wheezing, HEARN, pleuritic pain   Cardiology:   chest pain, palpitations, orthopnea, PND, edema, syncope   Gastrointestinal:  abdominal pain , N/V, dysphagia, diarrhea, constipation, bleeding   Genitourinary:  frequency, urgency, dysuria, hematuria, incontinence   Muskuloskeletal :  arthralgia, myalgia   Hematology:  easy bruising, nose or gum bleeding, lymphadenopathy   Dermatological: rash, ulceration, pruritis   Endocrine:   hot flashes or polydipsia   Neurological:  headache, dizziness, confusion, focal weakness, paresthesia,     Speech difficulties, memory loss, gait disturbance  Psychological: Feelings of anxiety, depression, agitation    Objective:     Visit Vitals  /67 (BP 1 Location: Left upper arm, BP Patient Position: At rest)   Pulse 95   Temp 98.9 °F (37.2 °C)   Resp 22   Wt 61.3 kg (135 lb 3.2 oz)   SpO2 100%   BMI 21.18 kg/m²     Temp (24hrs), Av.5 °F (38.1 °C), Min:98.9 °F (37.2 °C), Max:103.1 °F (39.5 °C)       Lines:  Peripheral line    PHYSICAL EXAM:   General:    Alert, cooperative, no distress, appears stated age. HEENT: Atraumatic, anicteric sclerae, pink conjunctivae     No oral ulcers, mucosa moist, throat clear  Neck:  Supple, symmetrical,  thyroid: non tender  Lungs:   Clear to auscultation bilaterally. No Wheezing or Rhonchi. No rales. Chest wall:  No tenderness  No Accessory muscle use. Heart:   Regular  rhythm,  No  murmur   No edema  Abdomen:   Soft, non-tender. Not distended. Bowel sounds normal  Extremities: No cyanosis. No clubbing  Skin:     Not pale. Not Jaundiced  No rashes   Psych:  Good insight. Not depressed. Not anxious or agitated. Neurologic: EOMs intact. No facial asymmetry. No aphasia or slurred speech. Alert and oriented X 4.        Data Review:     CBC:  Recent Labs     21  0910 21  0447 21  0352   WBC  --  3.8 4.2   GRANS  --  88* 85*   MONOS  --  2* 0*   EOS  --  0 0   ANEU  --  3.6 4.2   ABL  --  0.1* 0.0*   HGB 8.5* 7.0* 7.6*   HCT 27.3* 23.5* 24.6*   PLT  --  134* 173 BMP:  Recent Labs     04/06/21  0447 04/05/21  0352   CREA 0.89 0.85   BUN 14 13    137   K 3.8 3.5    108   CO2 21 21   AGAP 10 8   * 112*       LFTS:  Recent Labs     04/06/21  1526 04/05/21  0352   TBILI 1.0 0.7   ALT 72 52   AP 85 58   TP 6.4 6.5   ALB 2.8* 3.0*       Microbiology:     All Micro Results     Procedure Component Value Units Date/Time    CULTURE, BLOOD, PAIRED [[id number]] Collected: 04/07/21 0910    Order Status: Completed Specimen: Blood Updated: 04/07/21 1042    CULTURE, BLOOD, PAIRED [[id number]]     Order Status: Sent Specimen: Blood     CULTURE, BLOOD, PAIRED [[id number]]  (Abnormal) Collected: 04/06/21 1512    Order Status: Completed Specimen: Blood Updated: 04/07/21 0707     Special Requests: NO SPECIAL REQUESTS        Culture result:       GRAM POSITIVE COCCI IN CLUSTERS GROWING IN ALL 4 BOTTLES DRAWN NO SITES INDICATED          CULTURE, BLOOD, PAIRED [[id number]]     Order Status: Sent Specimen: Blood           Signed By: Dima Farrell NP     April 7, 2021

## 2021-04-07 NOTE — CONSULTS
Alden Solo    Name:  Asad Linares  MR#:  [id number]  :  1985  ACCOUNT #:  [de-identified]  DATE OF SERVICE:  2021      REASON FOR ADMISSION:  Fever and nausea. REASON FOR CONSULTATION:  Anemia. HISTORY OF PRESENT ILLNESS:  The patient is a 80-year-old woman, she is single. She explains that she works at a [employer]. She has had difficulty with fibroids in the past and anemia, and recently underwent what sounds like a myomectomy in [place]. Since last Friday, she has noticed fever and nausea, was seen at [address] [place] [address] and more recently in our ER, and was noted to have loss of balance, slurred speech, and fever of 103-104. She underwent a CT scan of the brain that showed an abnormality in the cerebellum. She was seen promptly by Dr. Amanda Swain, and ultimate imaging including an MRI revealed numerous supratentorial and infratentorial acute infarcts, largest in the left superior cerebellum, most consistent with embolic etiology. She was admitted to the hospital, seen promptly by the Neurology service, and has been started on vancomycin. An echocardiogram has been ordered. This morning, she explains she has discomfort in her hands. She is diffusely weak. She denies any IV drug use. PAST MEDICAL HISTORY:  Fibroids, endometriosis. CURRENT MEDICATIONS:  In the hospital,  1. Vancomycin 1.5 g IV once  2. Lipitor 40 mg once daily. 3.  Aspirin 81 mg once daily. ALLERGIES:  NO KNOWN DRUG ALLERGIES. SOCIAL HISTORY:  She is single. She does not use tobacco or alcohol. 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 FAMILY HISTORY:  Mother with high blood pressure. Father with high blood pressure. One older brother alive and well. REVIEW OF SYSTEMS:  GENERAL:  Fevers as above. HEENT:  She does admit to a change in her right visual field. LYMPHATIC:  No lumps or bumps in neck, underarm, or groin.   CARDIOVASCULAR:  No chest pain or palpitations. PULMONARY:  No cough or shortness of breath. GASTROINTESTINAL:  No abdominal pain, diarrhea, or constipation. GENITOURINARY:  No dysuria or incontinence. SKIN:  No rash or changing mole  MUSCULOSKELETAL:  As above. NEUROPSYCHIATRIC:  As above. PHYSICAL EXAMINATION:  GENERAL:  Pleasant, in no acute distress. VITAL SIGNS:  BP 93/61, pulse 92, saturating 100% on room air. HEENT:  Sclerae anicteric. Oropharynx clear. NECK:  Supple. No lymphadenopathy or thyromegaly. LUNGS:  Clear to auscultation bilaterally. HEART:  Regular rate and rhythm without murmur, rub, or gallop. ABDOMEN:  Nontender and nondistended. Normoactive bowel sounds. No hepatosplenomegaly. EXTREMITIES:  No clubbing, cyanosis, or edema. PSYCHIATRIC:  Normal mood and affect. Alert and oriented x3. LABORATORY DATA:  She was noted to have a hemoglobin of 7.0. This is down over the last few days from an admission hemoglobin of 9.2, most likely due to hemoconcentration. She has a normal differential, in fact, there is a touch of a left shift. She had a retic count of 0.2. BUN and creatinine are normal at 14 and 0.9. AST elevated at 125. Iron was less than 5, and therefore, iron sat could not be calculated. Haptoglobin 276. . ASSESSMENT AND PLAN:  A 27-year-old woman admitted with fever, nausea, and weakness, found to have 4/4 bottles positive for Gram-positive cocci and multiple central nervous system lesions concerning for infective endocarditis. Started on antibiotics, seen by Neurology. Echocardiogram pending. Asked to see for anemia, has a history of fibroids with very low iron indices and elevated sedimentation rate. 1.  Anemia:  Multifactorial, likely due to iron deficiency as well as anemia of chronic inflammation. We will give IV Venofer today and await results of echo. The patient denies IV drug use. Thank you for the consult.       [address] [place] [address] MD [email or web address]  D:  04/07/2021 8:50  T:  04/07/2021 10:54  JOB #:  [id number]

## 2021-04-08 ENCOUNTER — ANESTHESIA (OUTPATIENT)
Dept: NON INVASIVE DIAGNOSTICS | Age: 36
DRG: 871 | End: 2021-04-08
Payer: COMMERCIAL

## 2021-04-08 ENCOUNTER — HOSPITAL ENCOUNTER (OUTPATIENT)
Dept: NON INVASIVE DIAGNOSTICS | Age: 36
Discharge: HOME | End: 2021-04-08
Attending: NURSE PRACTITIONER
Payer: COMMERCIAL

## 2021-04-08 ENCOUNTER — APPOINTMENT (OUTPATIENT)
Dept: VASCULAR SURGERY | Age: 36
DRG: 871 | End: 2021-04-08
Attending: NURSE PRACTITIONER
Payer: COMMERCIAL

## 2021-04-08 ENCOUNTER — ANESTHESIA EVENT (OUTPATIENT)
Dept: NON INVASIVE DIAGNOSTICS | Age: 36
DRG: 871 | End: 2021-04-08
Payer: COMMERCIAL

## 2021-04-08 VITALS
HEART RATE: 117 BPM | DIASTOLIC BLOOD PRESSURE: 60 MMHG | OXYGEN SATURATION: 98 % | RESPIRATION RATE: 31 BRPM | TEMPERATURE: 102.8 F | BODY MASS INDEX: 22.44 KG/M2 | WEIGHT: 143 LBS | SYSTOLIC BLOOD PRESSURE: 106 MMHG | HEIGHT: 67 IN

## 2021-04-08 LAB
AT III PPP CHRO-ACNC: 89 % (ref 75–135)
PROT C PPP-ACNC: 59 % (ref 73–180)
PROT S ACT/NOR PPP: 51 % (ref 63–140)
PROT S AG ACT/NOR PPP IA: 62 % (ref 60–150)
PROT S FREE AG ACT/NOR PPP IA: 58 % (ref 57–157)

## 2021-04-08 PROCEDURE — 93320 DOPPLER ECHO COMPLETE: CPT | Performed by: INTERNAL MEDICINE

## 2021-04-08 PROCEDURE — 74011000250 HC RX REV CODE- 250: Performed by: NURSE ANESTHETIST, CERTIFIED REGISTERED

## 2021-04-08 PROCEDURE — 74011250637 HC RX REV CODE- 250/637: Performed by: NURSE PRACTITIONER

## 2021-04-08 PROCEDURE — 65660000000 HC RM CCU STEPDOWN

## 2021-04-08 PROCEDURE — 76060000032 HC ANESTHESIA 0.5 TO 1 HR

## 2021-04-08 PROCEDURE — 74011250636 HC RX REV CODE- 250/636: Performed by: HOSPITALIST

## 2021-04-08 PROCEDURE — 93325 DOPPLER ECHO COLOR FLOW MAPG: CPT | Performed by: INTERNAL MEDICINE

## 2021-04-08 PROCEDURE — 74011250637 HC RX REV CODE- 250/637: Performed by: HOSPITALIST

## 2021-04-08 PROCEDURE — P9045 ALBUMIN (HUMAN), 5%, 250 ML: HCPCS | Performed by: HOSPITALIST

## 2021-04-08 PROCEDURE — 74011000250 HC RX REV CODE- 250: Performed by: NURSE PRACTITIONER

## 2021-04-08 PROCEDURE — 93312 ECHO TRANSESOPHAGEAL: CPT

## 2021-04-08 PROCEDURE — 93312 ECHO TRANSESOPHAGEAL: CPT | Performed by: INTERNAL MEDICINE

## 2021-04-08 PROCEDURE — 74011250636 HC RX REV CODE- 250/636: Performed by: NURSE PRACTITIONER

## 2021-04-08 PROCEDURE — 99232 SBSQ HOSP IP/OBS MODERATE 35: CPT | Performed by: PSYCHIATRY & NEUROLOGY

## 2021-04-08 PROCEDURE — B24BZZ4 ULTRASONOGRAPHY OF HEART WITH AORTA, TRANSESOPHAGEAL: ICD-10-PCS | Performed by: INTERNAL MEDICINE

## 2021-04-08 PROCEDURE — 74011250637 HC RX REV CODE- 250/637: Performed by: PSYCHIATRY & NEUROLOGY

## 2021-04-08 PROCEDURE — 74011250636 HC RX REV CODE- 250/636: Performed by: NURSE ANESTHETIST, CERTIFIED REGISTERED

## 2021-04-08 RX ORDER — HYDROCORTISONE 25 MG/G
CREAM TOPICAL 4 TIMES DAILY
Status: DISCONTINUED | OUTPATIENT
Start: 2021-04-08 | End: 2021-04-10

## 2021-04-08 RX ORDER — PROPOFOL 10 MG/ML
INJECTION, EMULSION INTRAVENOUS AS NEEDED
Status: DISCONTINUED | OUTPATIENT
Start: 2021-04-08 | End: 2021-04-08 | Stop reason: HOSPADM

## 2021-04-08 RX ORDER — GLYCOPYRROLATE 0.2 MG/ML
INJECTION INTRAMUSCULAR; INTRAVENOUS AS NEEDED
Status: DISCONTINUED | OUTPATIENT
Start: 2021-04-08 | End: 2021-04-08 | Stop reason: HOSPADM

## 2021-04-08 RX ORDER — LIDOCAINE HYDROCHLORIDE 20 MG/ML
INJECTION, SOLUTION EPIDURAL; INFILTRATION; INTRACAUDAL; PERINEURAL AS NEEDED
Status: DISCONTINUED | OUTPATIENT
Start: 2021-04-08 | End: 2021-04-08 | Stop reason: HOSPADM

## 2021-04-08 RX ORDER — SODIUM CHLORIDE 9 MG/ML
INJECTION, SOLUTION INTRAVENOUS
Status: DISCONTINUED | OUTPATIENT
Start: 2021-04-08 | End: 2021-04-08 | Stop reason: HOSPADM

## 2021-04-08 RX ADMIN — GLYCOPYRROLATE 0.2 MG: 0.2 INJECTION, SOLUTION INTRAMUSCULAR; INTRAVENOUS at 14:02

## 2021-04-08 RX ADMIN — ASPIRIN 81 MG: 81 TABLET, CHEWABLE ORAL at 10:21

## 2021-04-08 RX ADMIN — VANCOMYCIN HYDROCHLORIDE 750 MG: 750 INJECTION, POWDER, LYOPHILIZED, FOR SOLUTION INTRAVENOUS at 15:28

## 2021-04-08 RX ADMIN — ALBUMIN (HUMAN) 25 G: 12.5 INJECTION, SOLUTION INTRAVENOUS at 00:41

## 2021-04-08 RX ADMIN — LIDOCAINE HYDROCHLORIDE 60 MG: 20 INJECTION, SOLUTION EPIDURAL; INFILTRATION; INTRACAUDAL; PERINEURAL at 14:03

## 2021-04-08 RX ADMIN — HYDROCORTISONE: 25 CREAM TOPICAL at 13:00

## 2021-04-08 RX ADMIN — ATORVASTATIN CALCIUM 40 MG: 40 TABLET, FILM COATED ORAL at 10:21

## 2021-04-08 RX ADMIN — Medication 10 ML: at 23:19

## 2021-04-08 RX ADMIN — PROPOFOL 200 MG: 10 INJECTION, EMULSION INTRAVENOUS at 14:28

## 2021-04-08 RX ADMIN — ACETAMINOPHEN 650 MG: 325 TABLET, FILM COATED ORAL at 23:18

## 2021-04-08 RX ADMIN — CEFAZOLIN SODIUM 2 G: 1 INJECTION, POWDER, FOR SOLUTION INTRAMUSCULAR; INTRAVENOUS at 18:06

## 2021-04-08 RX ADMIN — SODIUM CHLORIDE 100 ML/HR: 9 INJECTION, SOLUTION INTRAVENOUS at 00:41

## 2021-04-08 RX ADMIN — PROPOFOL 200 MG: 10 INJECTION, EMULSION INTRAVENOUS at 14:12

## 2021-04-08 RX ADMIN — ACETAMINOPHEN 650 MG: 325 TABLET, FILM COATED ORAL at 00:41

## 2021-04-08 RX ADMIN — HYDROCORTISONE: 25 CREAM TOPICAL at 18:06

## 2021-04-08 RX ADMIN — SODIUM CHLORIDE: 900 INJECTION, SOLUTION INTRAVENOUS at 14:02

## 2021-04-08 RX ADMIN — ACETAMINOPHEN 650 MG: 325 TABLET, FILM COATED ORAL at 15:35

## 2021-04-08 RX ADMIN — Medication 10 ML: at 15:37

## 2021-04-08 RX ADMIN — Medication 10 ML: at 06:00

## 2021-04-08 NOTE — PROGRESS NOTES
Anesthesia name:  Dr. Magda Suarez    Anesthesia is present for case. Refer to anesthesia log for vitals.

## 2021-04-08 NOTE — PROGRESS NOTES
Verbal shift change report given to Mynor Thorpe (oncoming nurse) by Ester Aguirre (offgoing nurse). Report included the following information SBAR, Kardex, ED Summary, Procedure Summary, Intake/Output, MAR, Recent Results and Cardiac Rhythm Sinus Tach. Patient refused morning labs. Patient willing to attempt later. Desi Byers paged regarding morning labs. Albumin finished late and advised by pharmacy ok to give 0600 but patient and mother requested to wait. Notified oncoming nurse.

## 2021-04-08 NOTE — PROGRESS NOTES
Consult received chart reviewed, pt cleared by nursing. Pt declined PT eval. Now note updated order since my morning chart review for dopplers of LEs. PT will follow and see for eval as able and appropriate.  Rayshawn Nova, PT

## 2021-04-08 NOTE — PROGRESS NOTES
Hematology-Oncology Progress Note      Sandra Garcia  1985  [id number]  4/8/2021      Subjective:     Patient c/o joint pain today. BRITTA scheduled for 1:15. Allergies: Patient has no known allergies.   Current Facility-Administered Medications   Medication Dose Route Frequency Provider Last Rate Last Admin    hydrocortisone (ANUSOL-HC) 2.5 % rectal cream   PeriANAL QID Erlin Robyn, NP        albumin human 5% (BUMINATE) solution 25 g  25 g IntraVENous Q6H Jose Juan Bauer MD   25 g at 04/08/21 0041    Vancomycin- Pharmacy Dosing   Other Rx Dosing/Monitoring Jose Juan Bauer MD        vancomycin (VANCOCIN) 750 mg in 0.9% sodium chloride 250 mL (VIAL-MATE)  750 mg IntraVENous Q12H Jose Juan Bauer MD   750 mg at 04/07/21 2203    0.9% sodium chloride infusion 250 mL  250 mL IntraVENous PRN Jose Juan Bauer MD        sodium chloride (NS) flush 5-40 mL  5-40 mL IntraVENous Q8H Jose Juan Bauer MD   10 mL at 04/08/21 0600    sodium chloride (NS) flush 5-40 mL  5-40 mL IntraVENous PRN Jose Juan Bauer MD        0.9% sodium chloride infusion  100 mL/hr IntraVENous CONTINUOUS Jose Juan Bauer  mL/hr at 04/08/21 0041 100 mL/hr at 04/08/21 0041    atorvastatin (LIPITOR) tablet 40 mg  40 mg Oral DAILY Jose Juan Bauer MD   40 mg at 04/07/21 0859    acetaminophen (TYLENOL) tablet 650 mg  650 mg Oral Q4H PRN Radha Cazares NP   650 mg at 04/08/21 0041    aspirin chewable tablet 81 mg  81 mg Oral DAILY Evelia Salgado MD   81 mg at 04/07/21 0859     Objective:     Patient Vitals for the past 24 hrs:   BP Temp Pulse Resp SpO2 Height Weight   04/08/21 0734   (!) 101 29      04/08/21 0731 97/62 97.8 °F (36.6 °C) (!) 107 (!) 33 100 %     04/08/21 0730       65 kg (143 lb 3.2 oz)   04/08/21 0553   95       04/08/21 0551 (!) 92/56 99.2 °F (37.3 °C) (!) 101 25 100 %     04/08/21 0212  98.9 °F (37.2 °C)        04/08/21 0032 97/60 99.3 °F (37.4 °C) 99 20 100 %     04/07/21 2156 94/64 99.2 °F (37.3 °C) 98 28 100 %     04/07/21 1909 98/62 99.1 °F (37.3 °C) 99 24 100 %     04/07/21 1804  (!) 101.2 °F (38.4 °C)        04/07/21 1633 100/72 100.2 °F (37.9 °C) (!) 109 30 100 %     04/07/21 1421 111/67     5' 7\" (1.702 m) 61 kg (134 lb 7.7 oz)   04/07/21 1340      5' 7\" (1.702 m)    04/07/21 1129 111/67 98.9 °F (37.2 °C) 95 22 100 %         Gen: NAD  HEENT: PERRL, Sclerae anicteric  Cv: RRR without m/r/g  Pulm: CTA bilaterally  Abd: NABS, NTND, No HSM  Ext: No c/c/e    Available labs reviewed:  Labs:    Recent Results (from the past 24 hour(s))   CULTURE, BLOOD, PAIRED    Collection Time: 04/07/21  9:10 AM    Specimen: Blood   Result Value Ref Range    Special Requests: NO SPECIAL REQUESTS      Culture result: GRAM STAIN IN PROGRESS     HGB & HCT    Collection Time: 04/07/21  9:10 AM   Result Value Ref Range    HGB 8.5 (L) 11.5 - 16.0 g/dL    HCT 27.3 (L) 35.0 - 47.0 %   CBC W/O DIFF    Collection Time: 04/07/21 11:45 AM   Result Value Ref Range    WBC 5.1 3.6 - 11.0 K/uL    RBC 3.73 (L) 3.80 - 5.20 M/uL    HGB 8.6 (L) 11.5 - 16.0 g/dL    HCT 27.9 (L) 35.0 - 47.0 %    MCV 74.8 (L) 80.0 - 99.0 FL    MCH 23.1 (L) 26.0 - 34.0 PG    MCHC 30.8 30.0 - 36.5 g/dL    RDW 20.0 (H) 11.5 - 14.5 %    PLATELET 939 (L) 259 - 400 K/uL    NRBC 0.0 0  WBC    ABSOLUTE NRBC 0.00 0.00 - 0.01 K/uL   ECHO ADULT COMPLETE    Collection Time: 04/07/21  3:33 PM   Result Value Ref Range    IVSd 0.97 (A) 0.60 - 0.90 cm    LVIDd 5.07 3.90 - 5.30 cm    LVIDs 3.65 cm    LVPWd 1.03 (A) 0.60 - 0.90 cm    LVOT Peak Gradient 3.90 mmHg    LVOT Peak Velocity 98.78 cm/s    Left Atrium Major Axis 2.94 cm    AoV PG 4.97 mmHg    Aortic Valve Systolic Peak Velocity 669.58 cm/s    MV A Reji 49.89 cm/s    Mitral Valve E Wave Deceleration Time 121.99 ms    MV E Reji 62.87 cm/s    E/E' lateral 8.48     E/E' septal 5.03     LV E' Lateral Velocity 7.41 cm/s    LV E' Septal Velocity 12.50 cm/s    Mitral Valve Pressure Half-time 35.38 ms    MVA (PHT) 6.22 cm2    Pulmonic Valve Systolic Peak Instantaneous Gradient 4.21 mmHg    Pulmonic Valve Max Velocity 102.57 cm/s    Tapse 1.68 1.50 - 2.00 cm    Triscuspid Valve Regurgitation Peak Gradient 17.43 mmHg    TR Max Velocity 208.74 cm/s    Ao Root D 3.20 cm    MV E/A 1.26     LV Mass .2 67.0 - 162.0 g    LV Mass AL Index 109.0 43.0 - 95.0 g/m2    E/E' ratio (averaged) 6.76     Left Atrium Minor Axis 1.72 cm         Assessment and Plan     27 y/o woman with GPC in 4/4 bottles with new CNS abscesses, concerning for IE, scheduled for BRITTA today. Asked to see for multifactorial anemia with low iron, s/p IV iron. 1. Anemia: improving. I doubt she will return to normal unless/until her infection has been treated. Will follow along with you. Case d/w mother at bedside at length. I explained joint pain is common in our patients with IE given deposition of Ag/Ab complexes in the joint space. Thank you for allowing us to participate in the care of this very pleasant patient.     Deisy Carlisle MD  Hematology/Oncology  Phone [phone]

## 2021-04-08 NOTE — PROGRESS NOTES
Bedside and Verbal shift change report given to Delmi (oncoming nurse) by Katheryn Uriostegui RN (offgoing nurse). Report included the following information SBAR, Kardex, Intake/Output, MAR and Recent Results.

## 2021-04-08 NOTE — PROGRESS NOTES
No tele events, franklyn neg  No further cardiac tests planned  Cont tele while here  Please call us back as needed

## 2021-04-08 NOTE — PROGRESS NOTES
Patient sedated with assistance of anesthesia. BRITTA probe passed without difficulty, images obtained, and probe removed without incident. Patient tolerated procedure well. Please see full note in Cardiology section. Summary:  Normal LV systolic function. No evidence of LA thrombus  No evidence of valvular vegetation.   No PFO ASD

## 2021-04-08 NOTE — PROGRESS NOTES
Please note: patient received entire dose of venofer. ID NP was present when albumin was being hung, not iron. Call placed to clarify MISC.  order without a call back from NP.

## 2021-04-08 NOTE — PROGRESS NOTES
[address] [facility]  Hospitalist Group                                                                                          Hospitalist Progress Note  [address] [facility],   Answering service: [phone] from in house phone        Date of Service:  2021  NAME:  Deborah Diana  :  1985  MRN:  [id number]      Admission Summary:   Deborah Diana is a 28 y.o. female who presents with dizziness. History obtained from the patient and her mother at bedside. As per patient initially went to [facility] ED for fever chills for 3 days with nausea vomiting a Covid test was negative there and was sent home. Patient keep on feeling dizzy and came back to [facility] ER. Initial code CTA CT showed no acute issue but there was a probability of some findings in the cerebellum MRI was ordered. Patient also history of fibroid and heavy menstrual bleeding she has symptomatic anemia and has a follow-up appointment with OB/GYN next Thursday at [place]. She complained of fever and not associated with  any burning with urination completed her menstrual cycle earlier today and usually has been taking Tylenol to control her fevers.  Patient states she has had 3-4 episodes of diarrhea yesterday and earlier today (no blood) and she had several episodes of vomiting yesterday with an increased number of episodes of vomiting today  Admitted for further management    Interval history / Subjective: Follow up bacteremia. Patient seen and examined. Mother at bedside. Patient states she had a better night last night. Unable to get morning labs, hard stick. Discussed need for central line and patient hesitant. Plans for BRITTA today. Repeat BC positive. Patient hesitant to work with PT- discussed need for ambulation as possible to increase strength and reduce debility.       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 Repeat blood cultures 4/7 with 1/4 GPC in clusters  -Continue vancomycin. Will discuss additional cephalosporin coverage pending sensitivities  -BRITTA planned 4/8  -Hypercoagulable work-up    Chronic blood loss anemia due to main heavy menstrual bleed  -s/p 1 unit pRBCS  -Patient has a follow-up with OB/GYN as an outpatient at [place] next week    Nausea vomiting diarrhea: resolved could be viral illness-supportive care    Elevated troponin:  -probably demand supply mismatch from low hemoglobin    Low reticulocyte count possible bone marrow suppression:  -hematology oncology consulted     Code status:full  DVT prophylaxis: scd    Difficult to obtain labs. Will ask assistance from IV team for morning labs and repeat blood cultures. Will hold off on CVL due to persistent + blood cultures    PT/OT    Care Plan discussed with: Patient/Family and Nurse  Anticipated Disposition: Home w/Family  Anticipated Discharge: >48 hours     Hospital Problems  Never Reviewed          Codes Class Noted POA    Stroke (cerebrum) ([facility] [address] ICD-10-CM: I63.9  ICD-9-CM: 434.91  4/7/2021 Unknown        Dizziness ICD-10-CM: R42  ICD-9-CM: 780.4  4/6/2021 Unknown                Review of Systems:   A comprehensive review of systems was negative. Vital Signs:    Last 24hrs VS reviewed since prior progress note.  Most recent are:  Visit Vitals  BP 97/62 (BP 1 Location: Left upper arm, BP Patient Position: At rest)   Pulse (!) 101   Temp 97.8 °F (36.6 °C)   Resp 29   Ht 5' 7\" (1.702 m)   Wt 65 kg (143 lb 3.2 oz)   SpO2 100%   BMI 22.43 kg/m²         Intake/Output Summary (Last 24 hours) at 4/8/2021 1209  Last data filed at 4/7/2021 2203  Gross per 24 hour   Intake    Output 225 ml   Net -225 ml Physical Examination:     I had a face to face encounter with this patient and independently examined them on 4/8/2021 as outlined below:          Constitutional:  No acute distress, cooperative, pleasant    ENT:  Oral mucosa moist, oropharynx benign. Resp:  CTA bilaterally. No wheezing/rhonchi/rales. No accessory muscle use   CV:  Regular rhythm, normal rate, no murmurs, gallops, rubs    GI:  Soft, non distended, non tender. normoactive bowel sounds, no hepatosplenomegaly     Musculoskeletal:  No edema, warm, 2+ pulses throughout    Neurologic:  globally weak, left lower extremity with 2/5 strength, right LE with 3/5, left upper 3/5, right UE 4/5     Psych:  Good insight, Not anxious nor agitated. Data Review:    I personally reviewed  Image and labs    Mri Brain W Wo Cont    Result Date: 4/6/2021  1. Numerous supratentorial and infratentorial acute infarcts as above, largest in the left superior cerebellum, most consistent with embolic etiology. Cta Code Neuro Head And Neck W Cont    Result Date: 4/6/2021  No evidence for acute large vessel arterial occlusion or significant stenosis. No evidence for ischemic penumbra. Small focal perfusion abnormality in the left cerebellum of uncertain significance corresponding to focal area of hypoattenuation in the left cerebellum. Consider further evaluation with MR of the brain     Ct Code Neuro Head Wo Contrast    Result Date: 4/6/2021  Areas of low-attenuation in both cerebellar hemispheres, left greater than right, may represent old infarcts, although underlying mass lesions cannot be excluded; consider further evaluation with contrast enhanced brain MRI. No evidence of acute infarct or intracranial hemorrhage. The findings were called to Dr. Aram Mckeon on 4/6/2021 at 4:40 AM by Dr. Michael June. [place]ang[place]ayla [place] [address] Neuro Perf W Cbf    Result Date: 4/6/2021  No evidence for acute large vessel arterial occlusion or significant stenosis.  No evidence for ischemic penumbra. Small focal perfusion abnormality in the left cerebellum of uncertain significance corresponding to focal area of hypoattenuation in the left cerebellum. Consider further evaluation with MR of the brain     Labs:     Recent Labs     04/07/21  1145 04/07/21  0910 04/06/21  0447   WBC 5.1  --  3.8   HGB 8.6* 8.5* 7.0*   HCT 27.9* 27.3* 23.5*   *  --  134*     Recent Labs     04/06/21  0447      K 3.8      CO2 21   BUN 14   CREA 0.89   *   CA 7.7*     Recent Labs     04/06/21  1526   ALT 72   AP 85   TBILI 1.0   TP 6.4   ALB 2.8*   GLOB 3.6     Recent Labs     04/06/21  0447   INR 1.1   PTP 11.0      Recent Labs     04/06/21  1526   TIBC 303   PSAT Cannot be calculated      No results found for: FOL, RBCF   No results for input(s): PH, PCO2, PO2 in the last 72 hours.   Recent Labs     04/06/21  1526 04/06/21  0833 04/06/21  0447   TROIQ 0.61* 0.47* 0.56*     Lab Results   Component Value Date/Time    Cholesterol, total 68 04/06/2021 08:33 AM    HDL Cholesterol 17 04/06/2021 08:33 AM    LDL, calculated 16.6 04/06/2021 08:33 AM    Triglyceride 172 (H) 04/06/2021 08:33 AM    CHOL/HDL Ratio 4.0 04/06/2021 08:33 AM     Lab Results   Component Value Date/Time    Glucose (POC) 110 (H) 04/06/2021 05:08 AM     Lab Results   Component Value Date/Time    Color YELLOW/STRAW 04/05/2021 05:19 AM    Appearance CLEAR 04/05/2021 05:19 AM    Specific gravity 1.025 04/05/2021 05:19 AM    pH (UA) 5.5 04/05/2021 05:19 AM    Protein 100 (A) 04/05/2021 05:19 AM    Glucose Negative 04/05/2021 05:19 AM    Ketone Negative 04/05/2021 05:19 AM    Urobilinogen 1.0 04/05/2021 05:19 AM    Nitrites Negative 04/05/2021 05:19 AM    Leukocyte Esterase Negative 04/05/2021 05:19 AM    Epithelial cells MODERATE (A) 04/05/2021 05:19 AM    Bacteria 1+ (A) 04/05/2021 05:19 AM    WBC 5-10 04/05/2021 05:19 AM    RBC 0-5 04/05/2021 05:19 AM         Medications Reviewed:     Current Facility-Administered Medications   Medication Dose Route Frequency    hydrocortisone (ANUSOL-HC) 2.5 % rectal cream   PeriANAL QID    Vancomycin- Pharmacy Dosing   Other Rx Dosing/Monitoring    vancomycin (VANCOCIN) 750 mg in 0.9% sodium chloride 250 mL (VIAL-MATE)  750 mg IntraVENous Q12H    0.9% sodium chloride infusion 250 mL  250 mL IntraVENous PRN    sodium chloride (NS) flush 5-40 mL  5-40 mL IntraVENous Q8H    sodium chloride (NS) flush 5-40 mL  5-40 mL IntraVENous PRN    0.9% sodium chloride infusion  100 mL/hr IntraVENous CONTINUOUS    atorvastatin (LIPITOR) tablet 40 mg  40 mg Oral DAILY    acetaminophen (TYLENOL) tablet 650 mg  650 mg Oral Q4H PRN    aspirin chewable tablet 81 mg  81 mg Oral DAILY     ______________________________________________________________________  EXPECTED LENGTH OF STAY: 4d 9h  ACTUAL LENGTH OF STAY:          [address] [facility]

## 2021-04-08 NOTE — PROGRESS NOTES
TRANSFER - OUT REPORT:    Verbal report given to NAVA Sigala on Altagracia Head being [place] [facility] routine progression of care       Report consisted of patient's Situation, Background, Assessment and   Recommendations(SBAR). Information from the following report(s) Procedure Summary was reviewed with the receiving nurse. Opportunity for questions and clarification was provided.       Patient transported with:   [employer]

## 2021-04-08 NOTE — PROGRESS NOTES
Neurology Progress Note     NAME: Kole Mendoza   :  1985   MRN:  [id number]   DATE:  2021    Assessment:     Active Problems:    Dizziness (2021)      Stroke (cerebrum) ([facility] [address] (2021)      Pt is a 29yo RH female, presenting 21 with a 3-day history of fever to 103, N/V, and transient sxs of ataxia, dysarthria, left facial droop, difficulty with vision, and right eye droop, found to have bilateral cerebellar and cerebral infarcts, the largest in the left cerebellum and right corona radiata on MRI. Hgb was 7 with h/o chronic difficulties with iron def anemia related to fibroids and heavy menses. Blood cx were positive x 4 bottles. Iron level low. ESR 50. Hematology consulted due to retic count 0.2. TTE was unremarkable. Exam Tm 101.2 at 1804 yesterday, BP 92/56, 's, pt alert and appropriate, non-focal.     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 Subjective:   Pt is seen with her mother at the bedside. Pt c/o diffuse myalgias/arthralgias. Blurry vision in right eye, unchanged. No new neurological complaints.      Objective:   Chart reviewed since last seen    Current Facility-Administered Medications   Medication Dose Route Frequency    hydrocortisone (ANUSOL-HC) 2.5 % rectal cream   PeriANAL QID    ceFAZolin (ANCEF) 2 g in sterile water (preservative free) 20 mL IV syringe  2 g IntraVENous Q8H    Vancomycin- Pharmacy Dosing   Other Rx Dosing/Monitoring    vancomycin (VANCOCIN) 750 mg in 0.9% sodium chloride 250 mL (VIAL-MATE)  750 mg IntraVENous Q12H    0.9% sodium chloride infusion 250 mL  250 mL IntraVENous PRN    sodium chloride (NS) flush 5-40 mL  5-40 mL IntraVENous Q8H    sodium chloride (NS) flush 5-40 mL  5-40 mL IntraVENous PRN    0.9% sodium chloride infusion  100 mL/hr IntraVENous CONTINUOUS    atorvastatin (LIPITOR) tablet 40 mg  40 mg Oral DAILY    acetaminophen (TYLENOL) tablet 650 mg  650 mg Oral Q4H PRN    aspirin chewable tablet 81 mg  81 mg Oral DAILY       Visit Vitals  BP 97/62 (BP 1 Location: Left upper arm, BP Patient Position: At rest)   Pulse (!) 101   Temp 97.8 °F (36.6 °C)   Resp 29   Ht 5' 7\" (1.702 m)   Wt 143 lb 3.2 oz (65 kg)   SpO2 100%   BMI 22.43 kg/m²     Temp (24hrs), Av.1 °F (37.8 °C), Min:97.8 °F (36.6 °C), Max:102.8 °F (39.3 °C)      701 - 1900  In: 300 [I.V.:300]  Out: -    190 -  0700  In: 498.3 [I.V.:498.3]  Out: 225 [Urine:225]      Physical Exam:  General: Well developed well nourished patient in no apparent distress. Cardiac: Tachycardic, Regular rhythm with no murmurs. Extremities: 2+ Radial pulses, no cyanosis or edema    Neurological Exam:  Mental Status: Oriented to time, place and person. Speech and language intact. Attention and fund of knowledge appropriate. Normal recent and remote memory. Cranial Nerves:   VFF, PERRL, EOMI, no nystagmus, no ptosis. Facial movement is symmetric. Hearing is intact. Motor:  5/5 strength in upper and lower proximal and distal muscles. No tremors   Reflexes:      Sensory:   Intact to LT throughout   Gait:     Cerebellar:  FTN intact         Lab Review   No results found for this or any previous visit (from the past 24 hour(s)). Additional comments:  I have reviewed the patient's new clinical lab test results. I have personally reviewed the patient's radiographs. MRI  MRI Results (most recent):  Results from [place] encounter on 21   MRI BRAIN W WO CONT    Narrative EXAM:  MRI BRAIN W WO CONT    INDICATION:    cerebellar mass lesion    COMPARISON:  CTA head and neck 2021. CONTRAST: 12 ml Dotarem.     TECHNIQUE:    Multiplanar multisequence acquisition without and with contrast of the brain.    FINDINGS:  Moderate-sized acute infarcts in the left superior cerebellum, and multiple  smaller acute infarcts in the right mid and inferior cerebellum. There are  additional numerous punctate to small acute infarcts scattered throughout the  cerebral hemispheres, largest in the right corona radiata. There is small amount  of petechial hemorrhage noted associated with the acute cerebellar infarcts. The ventricles are normal in size and position. There is no extra-axial fluid  collection or mass effect. There is no cerebellar tonsillar herniation. Expected  arterial flow-voids are present. No evidence of abnormal enhancement. Scattered mucosal thickening in the left frontal sinus, bilateral ethmoidal air  cells, and left maxillary sinus without air-fluid level. The mastoid air cells  and middle ears are clear. The orbital contents are within normal limits. No  significant osseous or scalp lesions are identified. Impression 1. Numerous supratentorial and infratentorial acute infarcts as above, largest  in the left superior cerebellum, most consistent with embolic etiology. CT Results (most recent):  Results from Hospital Encounter encounter on 04/06/21   CT CODE NEURO PERF W CBF    Narrative *PRELIMINARY REPORT*    No significant perfusion defect. No acute thrombosis or significant intracranial  stenosis. Preliminary report was provided by Dr. Altaf Mccauley, the on-call radiologist, at 5:11  AM.    Final report to follow. *END PRELIMINARY REPORT*    FINAL REPORT:    CLINICAL HISTORY: Slurred speech    EXAMINATION:  CT ANGIOGRAPHY HEAD AND NECK     COMPARISON: CT head 4/6/2021    TECHNIQUE:  Following the uneventful administration of iodinated contrast  material, axial CT angiography of the head and neck was performed. Delayed axial  images through the head were also obtained. Coronal and sagittal reconstructions  were obtained. Manual postprocessing of images was performed.  3-D Sagittal  maximal intensity projection images were obtained. 3-D Coronal maximal  intensity projections were obtained. CT dose reduction was achieved through use  of a standardized protocol tailored for this examination and automatic exposure  control for dose modulation. CT perfusion analysis was performed using CT with  contrast administration, including postprocessing of parametric maps with the  determination of cerebral blood flow, cerebral blood volume, and mean transit  time. This study was analyzed by the [address] ai algorithm    FINDINGS:    Delayed contrast-enhanced head CT:    The ventricles are midline without hydrocephalus. There is no acute intra or  extra-axial hemorrhage. Focal hypoattenuation in the left cerebellum. The basal  cisterns are clear. The paranasal sinuses are clear. CTA NECK:    Great vessels: Normal arch anatomy with the origins patent. Right subclavian artery: Patent    Left subclavian artery: Patent    Right common carotid artery: Patent    Left common carotid artery: Patent    Cervical right internal carotid artery: Patent with no significant stenosis by  NASCET criteria. Cervical left internal carotid artery: Patent with no significant stenosis by  NASCET criteria. Right vertebral artery: Patent    Left vertebral artery: Patent    Mild C5-6 degenerative disc disease    Subcentimeter low-density left thyroid nodule    CTA HEAD:    Right cavernous internal carotid artery: Patent    Left cavernous internal carotid artery: Patent    Anterior cerebral arteries: Patent    Anterior communicating artery: Patent    Right middle cerebral artery: Patent    Left middle cerebral artery: Patent    Posterior communicating arteries: Patent    Posterior cerebral arteries: Patent    Basilar artery: Patent    Distal vertebral arteries: Patent    CT perfusion brain: No evidence for ischemic penumbra.  Small focal perfusion  abnormality in the left cerebellum of uncertain significance corresponding to  focal area of hypoattenuation in the left cerebellum. Measurements use NASCET criteria. Impression No evidence for acute large vessel arterial occlusion or significant stenosis. No evidence for ischemic penumbra. Small focal perfusion abnormality in the left  cerebellum of uncertain significance corresponding to focal area of  hypoattenuation in the left cerebellum. Consider further evaluation with MR of  the brain                 Care Plan discussed with:  Patient x   Family x   RN    Care Manager    Consultant/Specialist:       Signed: Pollo Landers MD

## 2021-04-08 NOTE — PROGRESS NOTES
ID Progress Note  2021    Subjective:     C/o bilateral calf pain with movement  Review of Systems:            Symptom Y/N Comments   Symptom Y/N Comments   Fever/Chills n      Chest Pain  n      Poor Appetite       Edema        Cough       Abdominal Pain        Sputum       Joint Pain        SOB/HEARN  n     Pruritis/Rash        Nausea/vomit n      Tolerating PT/OT        Diarrhea  n     Tolerating Diet        Constipation  n     Other           Could NOT obtain due to:       Objective:     Vitals:   Visit Vitals  BP (!) 92/56 (BP 1 Location: Left upper arm, BP Patient Position: At rest)   Pulse 95   Temp 99.2 °F (37.3 °C)   Resp 25   Ht 5' 7\" (1.702 m)   Wt 65 kg (143 lb 3.2 oz)   SpO2 100%   BMI 22.43 kg/m²        Tmax:  Temp (24hrs), Av.5 °F (37.5 °C), Min:98.9 °F (37.2 °C), Max:101.2 °F (38.4 °C)      PHYSICAL EXAM:  General: WD, WN. Alert, cooperative, no acute distress    EENT:  EOMI. Anicteric sclerae. MMM  Resp:  CTA bilaterally, no wheezing or rales. No accessory muscle use  CV:  Regular  rhythm,  No edema  GI:  Soft, Non distended, Non tender. +Bowel sounds  Neurologic:  Alert and oriented X 3, normal speech,   Psych:   Good insight. Not anxious nor agitated  Skin:  No rashes.   No jaundice    Labs:   Lab Results   Component Value Date/Time    WBC 5.1 2021 11:45 AM    HGB 8.6 (L) 2021 11:45 AM    HCT 27.9 (L) 2021 11:45 AM    PLATELET 121 (L)  11:45 AM    MCV 74.8 (L) 2021 11:45 AM     Lab Results   Component Value Date/Time    Sodium 138 2021 04:47 AM    Potassium 3.8 2021 04:47 AM    Chloride 107 2021 04:47 AM    CO2 21 2021 04:47 AM    Anion gap 10 2021 04:47 AM    Glucose 103 (H) 2021 04:47 AM    BUN 14 2021 04:47 AM    Creatinine 0.89 2021 04:47 AM    BUN/Creatinine ratio 16 2021 04:47 AM    GFR est AA >60 2021 04:47 AM    GFR est non-AA >60 2021 04:47 AM    Calcium 7.7 (L) 2021 04:47 AM Bilirubin, total 1.0 04/06/2021 03:26 PM    Alk.  phosphatase 85 04/06/2021 03:26 PM    Protein, total 6.4 04/06/2021 03:26 PM    Albumin 2.8 (L) 04/06/2021 03:26 PM    Globulin 3.6 04/06/2021 03:26 PM    A-G Ratio 0.8 (L) 04/06/2021 03:26 PM    ALT (SGPT) 72 04/06/2021 03:26 PM     IV vancomycin 4/6 -  IV ancef 4/8 -       Assessment and Plan   GPC bacteremia  Fever  - blood cx (4/6) 4/4 bottles grew GPC; identification and susceptibility pending    Blood cx (4/7) 1/4 bottle GPC in clusters    Ordered another pair of blood cx; repeat blood cx until it gets negative    Normal WBC, T-max 101.2    CRP 22.6, procalcitonin 6.25, ->309    COVID 19 (-)    TTE (4/7) no mention of presence or absence of vegetation    BRITTA to be done today to r/o vegetation - cardiology following    Continue with IV vancomycin & ancef     Fever work up if temp >=100.4     Bilateral calf pain - check duplex study of lower extremities    Acute CVA, multiple embolic event  - neurology following; hypercoagulable work up in process     Anemia; multifactorial   Iron deficient anemia  Hx of menorrhagia  - heme/onc following; s/p one unit of PRBC          Bridgett Hernandez NP

## 2021-04-08 NOTE — ANESTHESIA POSTPROCEDURE EVALUATION
Post-Anesthesia Evaluation and Assessment    Patient: Meera Randle MRN: [id number]  SSN: [id number]    YOB: 1985  Age: 28 y.o. Sex: female      I have evaluated the patient and they are stable and ready for discharge from the PACU. Cardiovascular Function/Vital Signs  Visit Vitals  BP (!) 97/51   Pulse (!) 113   Temp (!) 39.3 °C (102.8 °F)   Resp (!) 31   Ht 5' 7\" (1.702 m)   Wt 64.9 kg (143 lb)   SpO2 100%   BMI 22.40 kg/m²       Patient is status post * No anesthesia type entered * anesthesia for * No procedures listed *. Nausea/Vomiting: None    Postoperative hydration reviewed and adequate. Pain:  Pain Scale 1: Numeric (0 - 10) (04/08/21 1344)  Pain Intensity 1: 2 (04/08/21 1344)   Managed    Neurological Status: At baseline    Mental Status, Level of Consciousness: Alert and  oriented to person, place, and time    Pulmonary Status:   O2 Device: Nasal cannula (04/08/21 1437)   Adequate oxygenation and airway patent    Complications related to anesthesia: None    Post-anesthesia assessment completed. No concerns    Signed By: Yvonne Lee MD     April 8, 2021              * No procedures listed *. MAC    <BSHSIANPOST>    INITIAL Post-op Vital signs:   Vitals Value Taken Time   BP 98/57 04/08/21 1451   Temp     Pulse 107 04/08/21 1451   Resp 32 04/08/21 1451   SpO2 100 % 04/08/21 1451   Vitals shown include unvalidated device data.

## 2021-04-09 ENCOUNTER — APPOINTMENT (OUTPATIENT)
Dept: CT IMAGING | Age: 36
DRG: 871 | End: 2021-04-09
Attending: NURSE PRACTITIONER
Payer: COMMERCIAL

## 2021-04-09 ENCOUNTER — APPOINTMENT (OUTPATIENT)
Dept: VASCULAR SURGERY | Age: 36
DRG: 871 | End: 2021-04-09
Attending: NURSE PRACTITIONER
Payer: COMMERCIAL

## 2021-04-09 LAB
ANION GAP SERPL CALC-SCNC: 10 MMOL/L (ref 5–15)
BACTERIA SPEC CULT: ABNORMAL
BUN SERPL-MCNC: 13 MG/DL (ref 6–20)
BUN/CREAT SERPL: 16 (ref 12–20)
CALCIUM SERPL-MCNC: 7.1 MG/DL (ref 8.5–10.1)
CHLORIDE SERPL-SCNC: 112 MMOL/L (ref 97–108)
CO2 SERPL-SCNC: 20 MMOL/L (ref 21–32)
CREAT SERPL-MCNC: 0.79 MG/DL (ref 0.55–1.02)
DRVVT MIX, 117894: 41.3 SEC (ref 0–40.4)
DRVVT RATIO 500585: 1.3 RATIO (ref 0.8–1.2)
GLUCOSE SERPL-MCNC: 90 MG/DL (ref 65–100)
INTERPRETATION, 117893: ABNORMAL
POTASSIUM SERPL-SCNC: 2.8 MMOL/L (ref 3.5–5.1)
PROT C AG PPP IA-ACNC: 52 % (ref 60–150)
SCREEN APTT: 47.6 SEC (ref 0–51.9)
SCREEN DRVVT: 54.4 SEC (ref 0–47)
SERVICE CMNT-IMP: ABNORMAL
SODIUM SERPL-SCNC: 142 MMOL/L (ref 136–145)

## 2021-04-09 PROCEDURE — 65660000000 HC RM CCU STEPDOWN

## 2021-04-09 PROCEDURE — 05H933Z INSERTION OF INFUSION DEVICE INTO RIGHT BRACHIAL VEIN, PERCUTANEOUS APPROACH: ICD-10-PCS | Performed by: INTERNAL MEDICINE

## 2021-04-09 PROCEDURE — 80048 BASIC METABOLIC PNL TOTAL CA: CPT

## 2021-04-09 PROCEDURE — 74011250636 HC RX REV CODE- 250/636: Performed by: NURSE PRACTITIONER

## 2021-04-09 PROCEDURE — 74011250637 HC RX REV CODE- 250/637: Performed by: INTERNAL MEDICINE

## 2021-04-09 PROCEDURE — 74011000636 HC RX REV CODE- 636: Performed by: RADIOLOGY

## 2021-04-09 PROCEDURE — 74011250637 HC RX REV CODE- 250/637: Performed by: NURSE PRACTITIONER

## 2021-04-09 PROCEDURE — 74011000250 HC RX REV CODE- 250: Performed by: NURSE PRACTITIONER

## 2021-04-09 PROCEDURE — 87040 BLOOD CULTURE FOR BACTERIA: CPT

## 2021-04-09 PROCEDURE — 36415 COLL VENOUS BLD VENIPUNCTURE: CPT

## 2021-04-09 PROCEDURE — 93970 EXTREMITY STUDY: CPT

## 2021-04-09 PROCEDURE — 74011250637 HC RX REV CODE- 250/637: Performed by: HOSPITALIST

## 2021-04-09 PROCEDURE — 87536 HIV-1 QUANT&REVRSE TRNSCRPJ: CPT

## 2021-04-09 PROCEDURE — 77030020365 HC SOL INJ SOD CL 0.9% 50ML

## 2021-04-09 PROCEDURE — 74011250636 HC RX REV CODE- 250/636: Performed by: HOSPITALIST

## 2021-04-09 PROCEDURE — C1751 CATH, INF, PER/CENT/MIDLINE: HCPCS

## 2021-04-09 PROCEDURE — 94760 N-INVAS EAR/PLS OXIMETRY 1: CPT

## 2021-04-09 PROCEDURE — 76937 US GUIDE VASCULAR ACCESS: CPT

## 2021-04-09 PROCEDURE — 74011250637 HC RX REV CODE- 250/637: Performed by: PSYCHIATRY & NEUROLOGY

## 2021-04-09 RX ORDER — POTASSIUM CHLORIDE 750 MG/1
40 TABLET, FILM COATED, EXTENDED RELEASE ORAL
Status: COMPLETED | OUTPATIENT
Start: 2021-04-09 | End: 2021-04-09

## 2021-04-09 RX ADMIN — ACETAMINOPHEN 650 MG: 325 TABLET, FILM COATED ORAL at 18:59

## 2021-04-09 RX ADMIN — VANCOMYCIN HYDROCHLORIDE 750 MG: 750 INJECTION, POWDER, LYOPHILIZED, FOR SOLUTION INTRAVENOUS at 03:13

## 2021-04-09 RX ADMIN — CEFAZOLIN SODIUM 2 G: 1 INJECTION, POWDER, FOR SOLUTION INTRAMUSCULAR; INTRAVENOUS at 18:57

## 2021-04-09 RX ADMIN — CEFAZOLIN SODIUM 2 G: 1 INJECTION, POWDER, FOR SOLUTION INTRAMUSCULAR; INTRAVENOUS at 10:38

## 2021-04-09 RX ADMIN — SODIUM CHLORIDE 100 ML/HR: 9 INJECTION, SOLUTION INTRAVENOUS at 12:39

## 2021-04-09 RX ADMIN — Medication 10 ML: at 22:03

## 2021-04-09 RX ADMIN — Medication 10 ML: at 07:10

## 2021-04-09 RX ADMIN — ASPIRIN 81 MG: 81 TABLET, CHEWABLE ORAL at 10:40

## 2021-04-09 RX ADMIN — POTASSIUM CHLORIDE 40 MEQ: 750 TABLET, FILM COATED, EXTENDED RELEASE ORAL at 21:02

## 2021-04-09 RX ADMIN — CEFAZOLIN SODIUM 2 G: 1 INJECTION, POWDER, FOR SOLUTION INTRAMUSCULAR; INTRAVENOUS at 03:13

## 2021-04-09 RX ADMIN — IOHEXOL 50 ML: 240 INJECTION, SOLUTION INTRATHECAL; INTRAVASCULAR; INTRAVENOUS; ORAL at 09:00

## 2021-04-09 RX ADMIN — ATORVASTATIN CALCIUM 40 MG: 40 TABLET, FILM COATED ORAL at 10:40

## 2021-04-09 NOTE — PROGRESS NOTES
TRANSFER - IN REPORT:    Verbal report received from PRASHANTHRN (name) on Yulissa Sue  being received from [facility] (unit) for routine progression of care      Report consisted of patients Situation, Background, Assessment and   Recommendations(SBAR). Information from the following report(s) SBAR, Kardex, Intake/Output, MAR, Recent Results, Cardiac Rhythm NSR/Sinus Tach and Dual Neuro Assessment was reviewed with the receiving nurse. Opportunity for questions and clarification was provided. Assessment completed upon patients arrival to unit and care assumed.

## 2021-04-09 NOTE — PROGRESS NOTES
[facility] [facility] Adult  Hospitalist Group                                                                                          Hospitalist Progress Note  Elisabet Jacinto DO  Answering service: [phone] from in house phone        Date of Service:  2021  NAME:  Altagracia Farris  :  1985  MRN:  [id number]      Admission Summary:   Altagracia Farris is a 28 y.o. female who presents with dizziness. History obtained from the patient and her mother at bedside. As per patient initially went to [address] [place] [address] ED for fever chills for 3 days with nausea vomiting a Covid test was negative there and was sent home. Patient keep on feeling dizzy and came back to [facility] ER. Initial code CTA CT showed no acute issue but there was a probability of some findings in the cerebellum MRI was ordered. Patient also history of fibroid and heavy menstrual bleeding she has symptomatic anemia and has a follow-up appointment with OB/GYN next Thursday at [place]. She complained of fever and not associated with  any burning with urination completed her menstrual cycle earlier today and usually has been taking Tylenol to control her fevers.  Patient states she has had 3-4 episodes of diarrhea yesterday and earlier today (no blood) and she had several episodes of vomiting yesterday with an increased number of episodes of vomiting today  Admitted for further management    Interval history / Subjective: Follow up bacteremia. Patient seen and examined. Mother at bedside. Persistently febrile. Cultures with MSSA. Plans for CT today.  Needs additional IV access and labs- will ask PICC team to assist. 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 Repeat blood cultures 4/7 with 2/4 GPC in clusters  -Continue vancomycin, ancef. ID following   -BRITTA 4/8 negative   -CT C/A/P    Chronic blood loss anemia due to main heavy menstrual bleed  -s/p 1 unit pRBCS, venofer   -Patient has a follow-up with OB/GYN as an outpatient at [place] next week    Nausea vomiting diarrhea: resolved    Elevated troponin:  -probably demand supply mismatch from low hemoglobin    Code status:full   DVT prophylaxis: scd    PT/OT    Difficult to obtain labs. Needs additional IV access for CT with IV contrast. Will ask PICC team to assist. If unable to obtain, may need CVL but would like to hold off if possible due to recurrent fevers     PT/OT    Care Plan discussed with: Patient/Family and Nurse  Anticipated Disposition: Home w/Family  Anticipated Discharge: >48 hours     Hospital Problems  Never Reviewed          Codes Class Noted POA    Stroke (cerebrum) ([facility] [address] ICD-10-CM: I63.9  ICD-9-CM: 434.91  4/7/2021 Unknown        Dizziness ICD-10-CM: R42  ICD-9-CM: 780.4  4/6/2021 Unknown                Review of Systems:   A comprehensive review of systems was negative. Vital Signs:    Last 24hrs VS reviewed since prior progress note. Most recent are:  Visit Vitals  BP (!) 103/59   Pulse 100   Temp (!) 102.3 °F (39.1 °C)   Resp 27   Ht 5' 7\" (1.702 m)   Wt 65 kg (143 lb 3.2 oz)   SpO2 99%   BMI 22.43 kg/m²         Intake/Output Summary (Last 24 hours) at 4/9/2021 1323  Last data filed at 4/8/2021 1431  Gross per 24 hour   Intake 300 ml   Output    Net 300 ml        Physical Examination:     I had a face to face encounter with this patient and independently examined them on 4/9/2021 as outlined below:          Constitutional:  No acute distress, cooperative, pleasant    ENT:  Oral mucosa moist, oropharynx benign. Resp:  CTA bilaterally. No wheezing/rhonchi/rales.  No accessory muscle use   CV:  Regular rhythm, normal rate, no murmurs, gallops, rubs    GI:  Soft, non distended, non tender. normoactive bowel sounds, no hepatosplenomegaly     Musculoskeletal:  No edema, warm, 2+ pulses throughout    Neurologic:  globally weak, without focal deficits      Psych:  Good insight, Not anxious nor agitated. Data Review:    I personally reviewed  Image and labs    No results found. Labs:     Recent Labs     04/07/21  1145 04/07/21  0910   WBC 5.1  --    HGB 8.6* 8.5*   HCT 27.9* 27.3*   *  --      No results for input(s): NA, K, CL, CO2, BUN, CREA, GLU, CA, MG, PHOS, URICA in the last 72 hours. Recent Labs     04/06/21  1526   ALT 72   AP 85   TBILI 1.0   TP 6.4   ALB 2.8*   GLOB 3.6     No results for input(s): INR, PTP, APTT, INREXT, INREXT in the last 72 hours. Recent Labs     04/06/21  1526   TIBC 303   PSAT Cannot be calculated      No results found for: FOL, RBCF   No results for input(s): PH, PCO2, PO2 in the last 72 hours.   Recent Labs     04/06/21  1526   TROIQ 0.61*     Lab Results   Component Value Date/Time    Cholesterol, total 68 04/06/2021 08:33 AM    HDL Cholesterol 17 04/06/2021 08:33 AM    LDL, calculated 16.6 04/06/2021 08:33 AM    Triglyceride 172 (H) 04/06/2021 08:33 AM    CHOL/HDL Ratio 4.0 04/06/2021 08:33 AM     Lab Results   Component Value Date/Time    Glucose (POC) 110 (H) 04/06/2021 05:08 AM     Lab Results   Component Value Date/Time    Color YELLOW/STRAW 04/05/2021 05:19 AM    Appearance CLEAR 04/05/2021 05:19 AM    Specific gravity 1.025 04/05/2021 05:19 AM    pH (UA) 5.5 04/05/2021 05:19 AM    Protein 100 (A) 04/05/2021 05:19 AM    Glucose Negative 04/05/2021 05:19 AM    Ketone Negative 04/05/2021 05:19 AM    Urobilinogen 1.0 04/05/2021 05:19 AM    Nitrites Negative 04/05/2021 05:19 AM    Leukocyte Esterase Negative 04/05/2021 05:19 AM    Epithelial cells MODERATE (A) 04/05/2021 05:19 AM    Bacteria 1+ (A) 04/05/2021 05:19 AM    WBC 5-10 04/05/2021 05:19 AM    RBC 0-5 04/05/2021 05:19 AM Medications Reviewed:     Current Facility-Administered Medications   Medication Dose Route Frequency    iopamidoL (ISOVUE-370) 76 % injection 100 mL  100 mL IntraVENous RAD ONCE    hydrocortisone (ANUSOL-HC) 2.5 % rectal cream   PeriANAL QID    ceFAZolin (ANCEF) 2 g in sterile water (preservative free) 20 mL IV syringe  2 g IntraVENous Q8H    Vancomycin- Pharmacy Dosing   Other Rx Dosing/Monitoring    vancomycin (VANCOCIN) 750 mg in 0.9% sodium chloride 250 mL (VIAL-MATE)  750 mg IntraVENous Q12H    0.9% sodium chloride infusion 250 mL  250 mL IntraVENous PRN    sodium chloride (NS) flush 5-40 mL  5-40 mL IntraVENous Q8H    sodium chloride (NS) flush 5-40 mL  5-40 mL IntraVENous PRN    0.9% sodium chloride infusion  100 mL/hr IntraVENous CONTINUOUS    atorvastatin (LIPITOR) tablet 40 mg  40 mg Oral DAILY    acetaminophen (TYLENOL) tablet 650 mg  650 mg Oral Q4H PRN    aspirin chewable tablet 81 mg  81 mg Oral DAILY     ______________________________________________________________________  EXPECTED LENGTH OF STAY: 4d 9h  ACTUAL LENGTH OF STAY:          2                 Heidy Sanderson DO

## 2021-04-09 NOTE — PROGRESS NOTES
Chart checked, pt now transferred to neuro unit. Pt cleared by nursing for eval. Obtained pt's info and pt's nurse took pt's temp and it was 101.8. Pt then stated that she just didn't want to refuse the eval but really did not feel up to it. Told her that PT can follow up tomorrow and she expressed gratitude. Pt shared that she lives alone in [address] [place] [address] in a one story apartment with a building that has an elevator. At baseline she is active and works for [employer]. Her mother is local and lives in a two story home with 1 step to enter and then 13 steps up to the bedroom level with a right ascending rail. Pt's mother who did answer some of the patient's questions mentioned perhaps pt will discharge to her home. Pt has no DME. Pt was admitted with dizziness, slurred speech, left facial droop, ataxia, right eye droop and impaired vision. MRI report: 1. Numerous supratentorial and infratentorial acute infarcts as above, largest in the left superior cerebellum, most consistent with embolic etiology.     Educated pt about BE FAST and provided her with a handout. PT plan is to follow up on the weekend for the eval.    As pt is able and safe, recommend that she is up to the chair for meals.  Sulema Pelayo, PT            Visit Vitals  BP (!) 102/59 (BP 1 Location: Right arm, BP Patient Position: Supine)   Pulse 97   Temp (!) 101.8 °F (38.8 °C)   Resp 22   Ht 5' 7\" (1.702 m)   Wt 69.9 kg (154 lb 3.2 oz)   SpO2 98%   BMI 24.15 kg/m²

## 2021-04-09 NOTE — PROCEDURES
Midline Insertion and Progress Note    PRE-PROCEDURE VERIFICATION  Correct Procedure: yes  Correct Site:  yes  Temperature: Temp: (!) 101.8 °F (38.8 °C), Temperature Source: Temp Source: Oral  Recent Labs     04/07/21  1145   *   WBC 5.1     Allergies: Patient has no known allergies. PROCEDURE DETAIL  A single lumen midline IV catheter was started for vascular access. The following documentation is in addition to the Midline properties in the lines/airways flowsheet :  Xylocaine 1% used intradermally  Lot #: [id number]  Catheter Total Length: 15 (cm)  External Catheter Length: 1 (cm)  Circumference: 26.5 (cm)  Vein Selection for Midline :right brachial  Complication Related to Insertion: none    Line is okay to use.  Olivia Hogan RN notified

## 2021-04-09 NOTE — PROGRESS NOTES
Orders received, chart reviewed and patient not cleared for therapy intervention at this time to ensure complete contrast for CT scan. PLOF: independent, lives alone in [place]. Recommend with nursing, ADLs with supervision/setup, once Egress Test completed then OOB to chair 3x/day and toileting via beside commode with assist. Thank you for completing as able in order to maintain patient strength, endurance and independence.

## 2021-04-09 NOTE — PROGRESS NOTES
ID Progress Note  2021    Subjective:     C/o low abd pain and bilateral calf pain with movement  Difficult to obtain blood work, pt declined for further peripheral stick. PICC line has been ordered per primary team  Review of Systems:            Symptom Y/N Comments   Symptom Y/N Comments   Fever/Chills n      Chest Pain  n      Poor Appetite       Edema        Cough       Abdominal Pain y       Sputum       Joint Pain        SOB/HEARN  n     Pruritis/Rash        Nausea/vomit n      Tolerating PT/OT        Diarrhea  n     Tolerating Diet        Constipation  n     Other  y  calf       Could NOT obtain due to:       Objective:     Vitals:   Visit Vitals  BP (!) 91/55 (BP 1 Location: Left arm, BP Patient Position: At rest)   Pulse 92   Temp 99.2 °F (37.3 °C)   Resp 24   Ht 5' 7\" (1.702 m)   Wt 65 kg (143 lb 3.2 oz)   SpO2 100%   BMI 22.43 kg/m²        Tmax:  Temp (24hrs), Av.1 °F (38.4 °C), Min:99.2 °F (37.3 °C), Max:102.8 °F (39.3 °C)      PHYSICAL EXAM:  General: WD, WN. Alert, cooperative, no acute distress    EENT:  EOMI. Anicteric sclerae. MMM  Resp:  CTA bilaterally, no wheezing or rales. No accessory muscle use  CV:  Regular  rhythm,  No edema  GI:  Soft, Non distended, Non tender. +Bowel sounds  Neurologic:  Alert and oriented X 3, normal speech,   Psych:   Good insight. Not anxious nor agitated  Skin:  No rashes.   No jaundice    Labs:   Lab Results   Component Value Date/Time    WBC 5.1 2021 11:45 AM    HGB 8.6 (L) 2021 11:45 AM    HCT 27.9 (L) 2021 11:45 AM    PLATELET 097 (L) 9152 11:45 AM    MCV 74.8 (L) 2021 11:45 AM     Lab Results   Component Value Date/Time    Sodium 138 2021 04:47 AM    Potassium 3.8 2021 04:47 AM    Chloride 107 2021 04:47 AM    CO2 21 2021 04:47 AM    Anion gap 10 2021 04:47 AM    Glucose 103 (H) 2021 04:47 AM    BUN 14 2021 04:47 AM    Creatinine 0.89 2021 04:47 AM    BUN/Creatinine ratio 16 04/06/2021 04:47 AM    GFR est AA >60 04/06/2021 04:47 AM    GFR est non-AA >60 04/06/2021 04:47 AM    Calcium 7.7 (L) 04/06/2021 04:47 AM    Bilirubin, total 1.0 04/06/2021 03:26 PM    Alk.  phosphatase 85 04/06/2021 03:26 PM    Protein, total 6.4 04/06/2021 03:26 PM    Albumin 2.8 (L) 04/06/2021 03:26 PM    Globulin 3.6 04/06/2021 03:26 PM    A-G Ratio 0.8 (L) 04/06/2021 03:26 PM    ALT (SGPT) 72 04/06/2021 03:26 PM     IV vancomycin 4/6 -  IV ancef 4/8 -       Assessment and Plan   GPC bacteremia  Fever  - blood cx (4/6) 4/4 bottles grew GPC; identification and susceptibility pending    Blood cx (4/7) 1/4 bottle GPC in clusters    Ordered another pair of blood cx on 4/8, but has not done yet, pt remain febrile, persistent (+) BC    ; repeat blood cx until it gets negative    Normal WBC, T-max 102.3    CRP 22.6, procalcitonin 6.25, ->309    COVID 19 (-)    TTE (4/7) no mention of presence or absence of vegetation    BRITTA (4/8) no vegtation      Source of bacteremia unclear at this time; recommend chest, abd, and pel CT with contrast     Check HIV; d/w pt who agreed for the testing      Continue with IV vancomycin & ancef     Fever work up if temp >=100.4     Bilateral calf pain - duplex study of lower extremities ordered on 4/8, has not done yet    Acute CVA, multiple embolic event  - neurology following; hypercoagulable work up in process     Anemia; multifactorial   Iron deficient anemia  Hx of menorrhagia  - heme/onc following       Bridgett Begum NP

## 2021-04-09 NOTE — PROGRESS NOTES
Hematology-Oncology Progress Note      Nilson Shea  1985  [id number]  4/9/2021      Subjective:     Reduced appetite. Normal BRITTA noted, no vegetations. Allergies: Patient has no known allergies.   Current Facility-Administered Medications   Medication Dose Route Frequency Provider Last Rate Last Admin    hydrocortisone (ANUSOL-HC) 2.5 % rectal cream   PeriANAL QID Donta Berry NP   Given at 04/08/21 1806    ceFAZolin (ANCEF) 2 g in sterile water (preservative free) 20 mL IV syringe  2 g IntraVENous Q8H Bridgett Clayton NP   2 g at 04/09/21 0313    Vancomycin- Pharmacy Dosing   Other Rx Dosing/Monitoring Dionna Kent MD        vancomycin (VANCOCIN) 750 mg in 0.9% sodium chloride 250 mL (VIAL-MATE)  750 mg IntraVENous Q12H Dionna Kent MD   750 mg at 04/09/21 0313    0.9% sodium chloride infusion 250 mL  250 mL IntraVENous PRN Dionna Kent MD        sodium chloride (NS) flush 5-40 mL  5-40 mL IntraVENous Q8H Dionna Kent MD   10 mL at 04/09/21 0710    sodium chloride (NS) flush 5-40 mL  5-40 mL IntraVENous PRN Dionna Kent MD        0.9% sodium chloride infusion  100 mL/hr IntraVENous CONTINUOUS Dionna Kent  mL/hr at 04/08/21 0041 100 mL/hr at 04/08/21 0041    atorvastatin (LIPITOR) tablet 40 mg  40 mg Oral DAILY Dionna Kent MD   40 mg at 04/08/21 1021    acetaminophen (TYLENOL) tablet 650 mg  650 mg Oral Q4H PRN Pamela Gloria NP   650 mg at 04/08/21 2318    aspirin chewable tablet 81 mg  81 mg Oral DAILY Raina Arzate MD   81 mg at 04/08/21 1021     Objective:     Patient Vitals for the past 24 hrs:   BP Temp Pulse Resp SpO2   04/09/21 0710 (!) 91/55 99.2 °F (37.3 °C) 92 24 100 %   04/09/21 0319 (!) 86/51 99.3 °F (37.4 °C) 90 23 97 %   04/09/21 0055  (!) 101.3 °F (38.5 °C)      04/09/21 0046     96 %   04/08/21 2311 (!) 95/49 (!) 102.3 °F (39.1 °C) (!) 117 25 100 %   04/08/21 1911 94/60 100.1 °F (37.8 °C) 96 24 100 %       Gen: NAD  HEENT: PERRL, Sclerae anicteric  Ext: No c/c/e    Available labs reviewed:  Labs:  No results found for this or any previous visit (from the past 24 hour(s)). Assessment and Plan     27 y/o woman with bacteremia with multiple CNS abscesses. Asked to see for anemia and low iron. BRITTA negative. 1. Bacteremia with CNS abscesses of unclear source. Will order CT C/A/P. Diff dx occult pelvic infection, PNA. 2. Anemia: multifactorial including iron def and ACD, improving. Thank you for allowing us to participate in the care of this very pleasant patient.     Bonifacio Quan MD  Hematology/Oncology  Phone [phone]

## 2021-04-09 NOTE — PROGRESS NOTES
Problem: Falls - Risk of  Goal: *Absence of Falls  Description: Document Rashel Muñoz Fall Risk and appropriate interventions in the flowsheet. Outcome: Progressing Towards Goal  Note: Fall Risk Interventions:  Pt remains free of falls during admission. Call bell and frequently used items within reach. Bedside table within reach. Pt provided nonskid socks and instructed to call out for nurse when in need of asssitance     Problem: Breathing Pattern - Ineffective  Goal: *Use of effective breathing techniques  Outcome: Progressing Towards Goal  Pt on RA. No respiratory distress. Will continue to monitor. Bedside shift change report given to [address] [place] [address] (oncoming nurse) by Mari King (offgoing nurse).  Report included the following information SBAR, Intake/Output, MAR, Recent Results, and Cardiac Rhythm ST .

## 2021-04-09 NOTE — PROGRESS NOTES
Bedside and Verbal shift change report given to NAVA Kim (oncoming nurse) by Jessica Kent (offgoing nurse). Report included the following information SBAR, Kardex, Intake/Output, MAR, Recent Results, Cardiac Rhythm NSR/Sinus Tach and Dual Neuro Assessment.

## 2021-04-09 NOTE — PROGRESS NOTES
Chart checked, case discussed with Dr. Perico Espinoza who cleared pt for PT (dopplers still not completed but per Dr. Perico Espinoza, her suspicions for LE DVT is low). Attempted eval but pt's nurse unable to clear pt at this time because pt still hs not finished the contrast for a CT scan (that appesrs to be imminent). PT will defer, follow, and see for eval as able.  Jennifer Thorpe, PT

## 2021-04-09 NOTE — PROGRESS NOTES
Bedside shift change report given to Aaron (oncoming nurse) by Bernarda Gong (offgoing nurse).  Report included the following information SBAR, Intake/Output, MAR, Recent Results and Cardiac Rhythm SR.

## 2021-04-10 LAB
HIV1 RNA # SERPL NAA+PROBE: <20 COPIES/ML
HIV1 RNA SERPL NAA+PROBE-LOG#: NORMAL LOG10COPY/ML

## 2021-04-10 PROCEDURE — 74011000250 HC RX REV CODE- 250: Performed by: NURSE PRACTITIONER

## 2021-04-10 PROCEDURE — 74011250637 HC RX REV CODE- 250/637: Performed by: NURSE PRACTITIONER

## 2021-04-10 PROCEDURE — 97162 PT EVAL MOD COMPLEX 30 MIN: CPT | Performed by: PHYSICAL THERAPIST

## 2021-04-10 PROCEDURE — 74011250637 HC RX REV CODE- 250/637: Performed by: PSYCHIATRY & NEUROLOGY

## 2021-04-10 PROCEDURE — 74011250637 HC RX REV CODE- 250/637: Performed by: HOSPITALIST

## 2021-04-10 PROCEDURE — 74011250636 HC RX REV CODE- 250/636: Performed by: NURSE PRACTITIONER

## 2021-04-10 PROCEDURE — 74011250636 HC RX REV CODE- 250/636: Performed by: HOSPITALIST

## 2021-04-10 PROCEDURE — 65660000000 HC RM CCU STEPDOWN

## 2021-04-10 PROCEDURE — 74011250637 HC RX REV CODE- 250/637: Performed by: INTERNAL MEDICINE

## 2021-04-10 PROCEDURE — 97530 THERAPEUTIC ACTIVITIES: CPT | Performed by: PHYSICAL THERAPIST

## 2021-04-10 PROCEDURE — 97116 GAIT TRAINING THERAPY: CPT | Performed by: PHYSICAL THERAPIST

## 2021-04-10 RX ORDER — POTASSIUM CHLORIDE 750 MG/1
40 TABLET, FILM COATED, EXTENDED RELEASE ORAL
Status: COMPLETED | OUTPATIENT
Start: 2021-04-10 | End: 2021-04-10

## 2021-04-10 RX ORDER — KETOROLAC TROMETHAMINE 30 MG/ML
15 INJECTION, SOLUTION INTRAMUSCULAR; INTRAVENOUS
Status: COMPLETED | OUTPATIENT
Start: 2021-04-10 | End: 2021-04-10

## 2021-04-10 RX ORDER — HYDROCORTISONE 25 MG/G
CREAM TOPICAL
Status: DISCONTINUED | OUTPATIENT
Start: 2021-04-10 | End: 2021-04-13 | Stop reason: HOSPADM

## 2021-04-10 RX ADMIN — ATORVASTATIN CALCIUM 40 MG: 40 TABLET, FILM COATED ORAL at 09:40

## 2021-04-10 RX ADMIN — Medication 10 ML: at 16:42

## 2021-04-10 RX ADMIN — CEFAZOLIN SODIUM 2 G: 1 INJECTION, POWDER, FOR SOLUTION INTRAMUSCULAR; INTRAVENOUS at 09:40

## 2021-04-10 RX ADMIN — KETOROLAC TROMETHAMINE 15 MG: 30 INJECTION, SOLUTION INTRAMUSCULAR at 04:21

## 2021-04-10 RX ADMIN — ACETAMINOPHEN 650 MG: 325 TABLET, FILM COATED ORAL at 16:41

## 2021-04-10 RX ADMIN — CEFAZOLIN SODIUM 2 G: 1 INJECTION, POWDER, FOR SOLUTION INTRAMUSCULAR; INTRAVENOUS at 17:28

## 2021-04-10 RX ADMIN — ACETAMINOPHEN 650 MG: 325 TABLET, FILM COATED ORAL at 21:58

## 2021-04-10 RX ADMIN — SODIUM CHLORIDE 100 ML/HR: 9 INJECTION, SOLUTION INTRAVENOUS at 22:02

## 2021-04-10 RX ADMIN — Medication 10 ML: at 21:54

## 2021-04-10 RX ADMIN — ACETAMINOPHEN 650 MG: 325 TABLET, FILM COATED ORAL at 02:44

## 2021-04-10 RX ADMIN — POTASSIUM CHLORIDE 40 MEQ: 750 TABLET, FILM COATED, EXTENDED RELEASE ORAL at 09:39

## 2021-04-10 RX ADMIN — SODIUM CHLORIDE 100 ML/HR: 9 INJECTION, SOLUTION INTRAVENOUS at 12:29

## 2021-04-10 RX ADMIN — Medication 10 ML: at 04:22

## 2021-04-10 RX ADMIN — ASPIRIN 81 MG: 81 TABLET, CHEWABLE ORAL at 09:40

## 2021-04-10 NOTE — PROGRESS NOTES
Problem: Mobility Impaired (Adult and Pediatric)  Goal: *Acute Goals and Plan of Care (Insert Text)  Description: FUNCTIONAL STATUS PRIOR TO ADMISSION: Patient was independent and active without use of DME. 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 HOME SUPPORT PRIOR TO ADMISSION: The patient lived alone and mother lives in [address] [place] [address] and she plans to have patient return to her home during recovery. Physical Therapy Goals  Initiated 4/10/2021  1. Patient will move from supine to sit and sit to supine  in bed with modified independence within 7 day(s). 2.  Patient will transfer from bed to chair and chair to bed with modified independence using the least restrictive device within 7 day(s). 3.  Patient will perform sit to stand with modified independence within 7 day(s). 4.  Patient will ambulate with modified independence for 100 feet with the least restrictive device within 7 day(s). 5.  Patient will ascend/descend 4 stairs with 1 handrail(s) with supervision/set-up within 7 day(s). Outcome: Progressing Towards Goal   PHYSICAL THERAPY EVALUATION  Patient: Tony Workman (34 y.o. female)  Date: 4/10/2021  Primary Diagnosis: Dizziness [R42]  Stroke (cerebrum) [facility]) [I63.9]        Precautions:   Fall    ASSESSMENT  Based on the objective data described below, the patient presents with decreased functional mobility from baseline level of function. Patient currently limited by generalized weakness, gait instability, impaired balance and decreased activity tolerance. Patient currently up in recliner with mother present during session. Noted intact sensation B and symmetrical strength B UE/LE with patient reports of generalized weakness. Needing Zina-CGA for sit to stand with cues for technique. Able to amb approx 6 feet forward and 6 feet backward with RW and Zina.   Patient with high anxiety levels regarding mobility and was not agreeable to attempt further mobility this session. However, supportive mother present and states she will work with patient on seated HEP as well as transfers to and from commode and bed with RW. Anticipate she will be able to DC back to [facility] house at time of DC with [place] PT follow. If mobility does not improve she may need IPR. Will continue to follow. Other factors to consider for discharge: at risk for falls, below functional baseline, anxiety      Patient will benefit from skilled therapy intervention to address the above noted impairments. PLAN :  Recommendations and Planned Interventions: bed mobility training, transfer training, gait training, therapeutic exercises, neuromuscular re-education, patient and family training/education, and therapeutic activities      Frequency/Duration: Patient will be followed by physical therapy:  5 times a week to address goals. Recommendation for discharge: (in order for the patient to meet his/her long term goals)  Anticipate she will be able to DC home with [place] PT and support of mother. Otherwise recommend IPR if mobility does not progress or improve as expected      IF patient discharges home will need the following DME: to be determined (TBD), may need RW         SUBJECTIVE:   Patient stated I'm a little stronger today.     OBJECTIVE DATA SUMMARY:   HISTORY:    History reviewed. No pertinent past medical history. No past surgical history on file.     Personal factors and/or comorbidities impacting plan of care:     Home Situation  Home Environment: Apartment(elevator lives in [address] [place] [address])  One/Two Story Residence: One story  Living Alone: Yes  Support Systems: Parent  Current DME Used/Available at Home: None    EXAMINATION/PRESENTATION/DECISION MAKING:   Critical Behavior:  Neurologic State: Alert  Orientation Level: Oriented X4  Cognition: Appropriate for age attention/concentration     Range Of Motion:  AROM: Within functional limits Strength:    Strength: Generally decreased, functional                    Tone & Sensation:                  Sensation: Intact(B UE and LE)    Functional Mobility:  Bed Mobility:   Not tested, patient up in chair upon PT arrival           Transfers:  Sit to Stand: Contact guard assistance; Additional time;Assist x1  Stand to Sit: Contact guard assistance; Additional time;Assist x1                       Balance:   Sitting: Intact  Standing: Impaired  Standing - Static: Constant support; Fair  Standing - Dynamic : Constant support; Fair  Ambulation/Gait Training:  Distance (ft): 6 Feet (ft)(forward and 6 backward)  Assistive Device: Gait belt;Walker, rolling  Ambulation - Level of Assistance: Minimal assistance;Assist x1     Gait Description (WDL): Exceptions to WDL  Gait Abnormalities: Decreased step clearance;Shuffling gait        Base of Support: Center of gravity altered; Widened     Speed/Earline: Pace decreased (<100 feet/min); Slow  Step Length: Left shortened;Right shortened        Pain Rating:  No c/o pain    Activity Tolerance:   Fair and requires frequent rest breaks    After treatment patient left in no apparent distress:   Sitting in chair, Call bell within reach, and Caregiver / family present    COMMUNICATION/EDUCATION:   The patients plan of care was discussed with: Physical therapist and Registered nurse. Fall prevention education was provided and the patient/caregiver indicated understanding., Patient/family have participated as able in goal setting and plan of care. , and Patient/family agree to work toward stated goals and plan of care.     Thank you for this referral.  Sania Sylvester, PT, DPT   Time Calculation: 35 mins

## 2021-04-10 NOTE — PROGRESS NOTES
Problem: Falls - Risk of  Goal: *Absence of Falls  Description: Document Liborio Keller Fall Risk and appropriate interventions in the flowsheet. Outcome: Progressing Towards Goal  Note: Fall Risk Interventions:  Mobility Interventions: PT Consult for mobility concerns, Patient to call before getting OOB         Medication Interventions: Bed/chair exit alarm, Patient to call before getting OOB    Elimination Interventions: Bed/chair exit alarm, Call light in reach, Patient to call for help with toileting needs              Problem: Patient Education: Go to Patient Education Activity  Goal: Patient/Family Education  Outcome: Progressing Towards Goal     Problem: Pressure Injury - Risk of  Goal: *Prevention of pressure injury  Description: Document Werner Scale and appropriate interventions in the flowsheet.   Outcome: Progressing Towards Goal  Note: Pressure Injury Interventions:  Sensory Interventions: Assess changes in LOC         Activity Interventions: Increase time out of bed, Pressure redistribution bed/mattress(bed type), PT/OT evaluation    Mobility Interventions: PT/OT evaluation, HOB 30 degrees or less, Pressure redistribution bed/mattress (bed type)    Nutrition Interventions: Document food/fluid/supplement intake                     Problem: Patient Education: Go to Patient Education Activity  Goal: Patient/Family Education  Outcome: Progressing Towards Goal     Problem: Breathing Pattern - Ineffective  Goal: *Use of effective breathing techniques  Outcome: Progressing Towards Goal  Goal: *PALLIATIVE CARE:  Alleviation of Dyspnea  Outcome: Progressing Towards Goal     Problem: Patient Education: Go to Patient Education Activity  Goal: Patient/Family Education  Outcome: Progressing Towards Goal     Problem: Patient Education: Go to Patient Education Activity  Goal: Patient/Family Education  Outcome: Progressing Towards Goal

## 2021-04-10 NOTE — PROGRESS NOTES
[facility]                                                                                          Hospitalist Progress Note  [address] [facility],   Answering service: [phone] OR 36 from in house phone        Date of Service:  4/10/2021  NAME:  Jeronimo Kelley  :  1985  MRN:  [id number]      Admission Summary:   Jeronimo Kelley is a 28 y.o. female who presents with dizziness. History obtained from the patient and her mother at bedside. As per patient initially went to [facility] ED for fever chills for 3 days with nausea vomiting a Covid test was negative there and was sent home. Patient keep on feeling dizzy and came back to [address] [place] [address] ER. Initial code CTA CT showed no acute issue but there was a probability of some findings in the cerebellum MRI was ordered. Patient also history of fibroid and heavy menstrual bleeding she has symptomatic anemia and has a follow-up appointment with OB/GYN next Thursday at [place]. She complained of fever and not associated with  any burning with urination completed her menstrual cycle earlier today and usually has been taking Tylenol to control her fevers.  Patient states she has had 3-4 episodes of diarrhea yesterday and earlier today (no blood) and she had several episodes of vomiting yesterday with an increased number of episodes of vomiting today  Admitted for further management    Interval history / Subjective: Follow up bacteremia. Patient seen and examined. Mother at bedside. Overnight, expressed concern regarding treatment course. Yesterday, PICC team was consulted for labs and IV placement due to difficult stick and multiple attempts. Patient went to CT scan and IV was attempted despite patient requesting to wait. Midline placed afterwards- now unable to draw labs but okay for medication infusion. Patient refused antibiotics overnight.  Per patient and mother, they thought they were refusing vancomycin, not the ancef. They both were concerned that vancomycin may be causing persistent fevers and muscle aches. Provided education on ancef monotherapy and vancomycin has been discontinued. Patient requesting CT abd/pelvis and to hold off on CT chest due to concerns for radiation exposure. Agreed and CT chest discontinued at this time. Requests CT completed later in the day- have communicated to nursing to help coordinate. Patient agreeable to work with physical therapy. 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 Repeat blood cultures 4/7 with 2/4 GPC in clusters. Repeat blood cultures 4/9 pending  -Continue cefazolin monotherapy. ID following   -BRITTA 4/8 negative   -CT abdomen/pelvis pending     Chronic blood loss anemia due to main heavy menstrual bleed  -s/p 1 unit pRBCS, venofer   -Patient has a follow-up with OB/GYN as an outpatient at [place] next week    Hypokalemia: replete     Nausea vomiting diarrhea: resolved    Elevated troponin:  -suspected to demand supply mismatch from low hemoglobin    Code status:full   DVT prophylaxis: scd    PT/OT    Patient eager to go home. Discussed need to CT. Also need control fevers and negative blood cultures. Will need to be here at least 48 more hours.      Care Plan discussed with: Patient/Family and Nurse  Anticipated Disposition: Home w/Family  Anticipated Discharge: >48 hours     Hospital Problems  Never Reviewed          Codes Class Noted POA    Stroke (cerebrum) ([facility] [address] ICD-10-CM: I63.9  ICD-9-CM: 434.91  4/7/2021 Unknown        Dizziness ICD-10-CM: R42  ICD-9-CM: 780.4  4/6/2021 Unknown                Review of Systems:   A comprehensive review of systems was negative. Vital Signs:    Last 24hrs VS reviewed since prior progress note. Most recent are:  Visit Vitals  /63 (BP 1 Location: Right arm, BP Patient Position: At rest)   Pulse 88   Temp 98.9 °F (37.2 °C)   Resp 20   Ht 5' 7\" (1.702 m)   Wt 72.4 kg (159 lb 11.2 oz)   SpO2 99%   BMI 25.01 kg/m²       No intake or output data in the 24 hours ending 04/10/21 0901     Physical Examination:     I had a face to face encounter with this patient and independently examined them on 4/10/2021 as outlined below:          Constitutional:  No acute distress, cooperative, pleasant    ENT:  Oral mucosa moist, oropharynx benign. Resp:  CTA bilaterally. No wheezing/rhonchi/rales. No accessory muscle use   CV:  Regular rhythm, normal rate, no murmurs, gallops, rubs    GI:  Soft, non distended, non tender. normoactive bowel sounds, no hepatosplenomegaly     Musculoskeletal:  No edema, warm, 2+ pulses throughout    Neurologic:  no focal deficits, subjective dizziness upon standing      Psych:  Good insight, Not anxious nor agitated. Data Review:    I personally reviewed  Image and labs    No results found. Labs:     Recent Labs     04/07/21  1145 04/07/21  0910   WBC 5.1  --    HGB 8.6* 8.5*   HCT 27.9* 27.3*   *  --      Recent Labs     04/09/21  1725      K 2.8*   *   CO2 20*   BUN 13   CREA 0.79   GLU 90   CA 7.1*     No results for input(s): ALT, AP, TBIL, TBILI, TP, ALB, GLOB, GGT, AML, LPSE in the last 72 hours. No lab exists for component: SGOT, GPT, AMYP, HLPSE  No results for input(s): INR, PTP, APTT, INREXT, INREXT in the last 72 hours. No results for input(s): FE, TIBC, PSAT, FERR in the last 72 hours. No results found for: FOL, RBCF   No results for input(s): PH, PCO2, PO2 in the last 72 hours. No results for input(s): CPK, CKNDX, TROIQ in the last 72 hours.     No lab exists for component: CPKMB  Lab Results   Component Value Date/Time    Cholesterol, total 68 04/06/2021 08:33 AM    HDL Cholesterol 17 04/06/2021 08:33 AM    LDL, calculated 16.6 04/06/2021 08:33 AM    Triglyceride 172 (H) 04/06/2021 08:33 AM    CHOL/HDL Ratio 4.0 04/06/2021 08:33 AM     Lab Results   Component Value Date/Time    Glucose (POC) 110 (H) 04/06/2021 05:08 AM     Lab Results   Component Value Date/Time    Color YELLOW/STRAW 04/05/2021 05:19 AM    Appearance CLEAR 04/05/2021 05:19 AM    Specific gravity 1.025 04/05/2021 05:19 AM    pH (UA) 5.5 04/05/2021 05:19 AM    Protein 100 (A) 04/05/2021 05:19 AM    Glucose Negative 04/05/2021 05:19 AM    Ketone Negative 04/05/2021 05:19 AM    Urobilinogen 1.0 04/05/2021 05:19 AM    Nitrites Negative 04/05/2021 05:19 AM    Leukocyte Esterase Negative 04/05/2021 05:19 AM    Epithelial cells MODERATE (A) 04/05/2021 05:19 AM    Bacteria 1+ (A) 04/05/2021 05:19 AM    WBC 5-10 04/05/2021 05:19 AM    RBC 0-5 04/05/2021 05:19 AM         Medications Reviewed:     Current Facility-Administered Medications   Medication Dose Route Frequency    hydrocortisone (ANUSOL-HC) 2.5 % rectal cream   PeriANAL DAILY PRN    potassium chloride SR (KLOR-CON 10) tablet 40 mEq  40 mEq Oral NOW    ceFAZolin (ANCEF) 2 g in sterile water (preservative free) 20 mL IV syringe  2 g IntraVENous Q8H    0.9% sodium chloride infusion 250 mL  250 mL IntraVENous PRN    sodium chloride (NS) flush 5-40 mL  5-40 mL IntraVENous Q8H    sodium chloride (NS) flush 5-40 mL  5-40 mL IntraVENous PRN    0.9% sodium chloride infusion  100 mL/hr IntraVENous CONTINUOUS    atorvastatin (LIPITOR) tablet 40 mg  40 mg Oral DAILY    acetaminophen (TYLENOL) tablet 650 mg  650 mg Oral Q4H PRN    aspirin chewable tablet 81 mg  81 mg Oral DAILY     ______________________________________________________________________  EXPECTED LENGTH OF STAY: 4d 9h  ACTUAL LENGTH OF STAY:          [address], DO

## 2021-04-10 NOTE — PROGRESS NOTES
Problem: Falls - Risk of  Goal: *Absence of Falls  Description: Document Gera Montero Fall Risk and appropriate interventions in the flowsheet.   Outcome: Progressing Towards Goal  Note: Fall Risk Interventions:  Mobility Interventions: PT Consult for mobility concerns         Medication Interventions: Bed/chair exit alarm, Teach patient to arise slowly, Patient to call before getting OOB    Elimination Interventions: Bed/chair exit alarm, Call light in reach, Patient to call for help with toileting needs              Problem: Patient Education: Go to Patient Education Activity  Goal: Patient/Family Education  Outcome: Progressing Towards Goal

## 2021-04-10 NOTE — PROGRESS NOTES
Bedside shift change report given to Liliana Gayle RN (oncoming nurse) by Corin Lindsay and Claire Lau RN (offgoing nurse). Report included the following information SBAR, Kardex, ED Summary, Intake/Output, Recent Results, Cardiac Rhythm NSR and Alarm Parameters .

## 2021-04-10 NOTE — PROGRESS NOTES
Pt refusing 0200 Ancef, educated on benefits and risk of refusing medication, pt states it \"makes her feel bad. \" Complains regarding pain and edema in hand, had Kpad ordered earlier in shift. Gave prn tylenol, pt states she is unable to give herself tylenol, mother at bedside assisting. Mother requesting Cherene Gram" pain medication or CBD oil. Cynthia Olivera, NP. One time dose of toradol ordered. Pt states she is planning on refusing CT of abdomen/pelvis in am due to treatment she has received in the past. Stated CT staff did not efficiently assist her to the restroom last attemp. Refusing labs due to midline no longer giving blood return.

## 2021-04-10 NOTE — PROGRESS NOTES
ID Progress Note  4/10/2021    Subjective:     She states that she is feeling ok. Still having fevers. Objective:     Antibiotics:  1. Cefazolin       Vitals:   Visit Vitals  BP (!) 92/55   Pulse 93   Temp 99 °F (37.2 °C)   Resp 23   Ht 5' 7\" (1.702 m)   Wt 72.4 kg (159 lb 11.2 oz)   SpO2 100%   BMI 25.01 kg/m²        Tmax:  Temp (24hrs), Av.3 °F (37.4 °C), Min:98.4 °F (36.9 °C), Max:101.3 °F (38.5 °C)      Exam:  Lungs:  clear to auscultation bilaterally  Heart:  regular rate and rhythm  Abdomen:  soft, non-tender. Bowel sounds normal. No masses,  no organomegaly    Labs:      Recent Labs     21  1725   BUN 13   CREA 0.79       Cultures:     No results found for: [facility]  Lab Results   Component Value Date/Time    Culture result: NO GROWTH AFTER 12 HOURS 2021 06:00 PM    Culture result: (A) 2021 09:10 AM     STAPHYLOCOCCUS AUREUS GROWING IN 2 OF 4 BOTTLES DRAWN (SITE = L UPPER ARM) (PLEASE REFER TO [facility] [id number] FOR SENSITIVITIES)    Culture result: REMAINING BOTTLE(S) HAS/HAVE NO GROWTH SO FAR 2021 09:10 AM       Radiology:     Line/Insert Date:           Assessment:     1. MSSA bacteremia--uncertain source, but most likely endocarditis  2. Anemia   3. CVA/embolic events    Objective:     1. Continue current therapy  2. Cultures until negative  3.  Plan on 4-6 weeks IV antibiotic therapy    Cathi Tracy MD

## 2021-04-11 PROCEDURE — 65660000000 HC RM CCU STEPDOWN

## 2021-04-11 PROCEDURE — 74011250636 HC RX REV CODE- 250/636: Performed by: NURSE PRACTITIONER

## 2021-04-11 PROCEDURE — 74011250636 HC RX REV CODE- 250/636: Performed by: HOSPITALIST

## 2021-04-11 PROCEDURE — 74011250637 HC RX REV CODE- 250/637: Performed by: NURSE PRACTITIONER

## 2021-04-11 PROCEDURE — 74011000250 HC RX REV CODE- 250: Performed by: INTERNAL MEDICINE

## 2021-04-11 PROCEDURE — 74011250637 HC RX REV CODE- 250/637: Performed by: HOSPITALIST

## 2021-04-11 PROCEDURE — 74011000250 HC RX REV CODE- 250: Performed by: NURSE PRACTITIONER

## 2021-04-11 PROCEDURE — 74011250637 HC RX REV CODE- 250/637: Performed by: PSYCHIATRY & NEUROLOGY

## 2021-04-11 PROCEDURE — 74011000258 HC RX REV CODE- 258: Performed by: INTERNAL MEDICINE

## 2021-04-11 RX ADMIN — ATORVASTATIN CALCIUM 40 MG: 40 TABLET, FILM COATED ORAL at 10:41

## 2021-04-11 RX ADMIN — Medication 10 ML: at 06:31

## 2021-04-11 RX ADMIN — NAFCILLIN SODIUM 2 G: 2 INJECTION, POWDER, LYOPHILIZED, FOR SOLUTION INTRAMUSCULAR; INTRAVENOUS at 23:16

## 2021-04-11 RX ADMIN — SODIUM CHLORIDE 100 ML/HR: 9 INJECTION, SOLUTION INTRAVENOUS at 06:32

## 2021-04-11 RX ADMIN — ACETAMINOPHEN 650 MG: 325 TABLET, FILM COATED ORAL at 21:03

## 2021-04-11 RX ADMIN — Medication 10 ML: at 21:03

## 2021-04-11 RX ADMIN — CEFAZOLIN SODIUM 2 G: 1 INJECTION, POWDER, FOR SOLUTION INTRAMUSCULAR; INTRAVENOUS at 10:40

## 2021-04-11 RX ADMIN — CEFAZOLIN SODIUM 2 G: 1 INJECTION, POWDER, FOR SOLUTION INTRAMUSCULAR; INTRAVENOUS at 01:37

## 2021-04-11 RX ADMIN — ASPIRIN 81 MG: 81 TABLET, CHEWABLE ORAL at 10:41

## 2021-04-11 RX ADMIN — ACETAMINOPHEN 650 MG: 325 TABLET, FILM COATED ORAL at 06:30

## 2021-04-11 NOTE — PROGRESS NOTES
ID Progress Note  2021    Subjective:     She states that she is not getting better, feels that she is weak and unable to walk. Fevers still present. Extensive discussion with patient and her mother at bedside regarding the need for continuing blood work to monitor electrolytes and her response to therapy. She is reluctant to have CTs and is frustrated with other ongoing testing. I discussed the importance of working this up, given the serious nature of the infection and that one test may lead to the need for another. If her CTs are OK and she remains afebrile with negative blood cultures, she hopefully will be able to be discharged by Wednesday. She is worried about her weakness and difficulty walking. I assured her that she was not being treated any differently than any other patient in a similar situation--they seemed grateful for the visit. Objective:     Antibiotics:  1. Cefazolin       Vitals:   Visit Vitals  /81 (BP 1 Location: Right arm, BP Patient Position: At rest)   Pulse 81   Temp 99.5 °F (37.5 °C)   Resp 22   Ht 5' 7\" (1.702 m)   Wt 73.9 kg (162 lb 14.4 oz)   SpO2 99%   BMI 25.51 kg/m²        Tmax:  Temp (24hrs), Av.8 °F (37.7 °C), Min:98.7 °F (37.1 °C), Max:101.5 °F (38.6 °C)      Exam:  Lungs:  clear to auscultation bilaterally  Heart:  regular rate and rhythm  Abdomen:  soft, non-tender.  Bowel sounds normal. No masses,  no organomegaly  Few ?vasculitic lesions on feet    Labs:      Recent Labs     21  1725   BUN 13   CREA 0.79       Cultures:     No results found for: SDES  Lab Results   Component Value Date/Time    Culture result: NO GROWTH 2 DAYS 2021 06:00 PM    Culture result: (A) 2021 09:10 AM     STAPHYLOCOCCUS AUREUS GROWING IN 2 OF 4 BOTTLES DRAWN (SITE = L UPPER ARM) (PLEASE REFER TO [facility] [id number] FOR SENSITIVITIES)    Culture result: REMAINING BOTTLE(S) HAS/HAVE NO GROWTH SO FAR 2021 09:10 AM       Radiology:     Line/Insert Date: Assessment:     1. 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 Certainly, other sources are possible, as well. 2. Anemia   3. CVA/embolic events    Objective:     1. Continue current therapy  2. Cultures until negative  3. Plan on 4-6 weeks IV antibiotic therapy  4. Consider asking Neuro to see again  5.  D/W patient, her mother and Dr Marco A Tsang MD

## 2021-04-11 NOTE — PROGRESS NOTES
[address] [facility]  Hospitalist Group                                                                                          Hospitalist Progress Note  [address] [facility], [place]  Answering service: [phone] OR 36 from in house phone        Date of Service:  2021  NAME:  Jose Gunderson  :  1985  MRN:  [id number]      Admission Summary:   Jose Gunderson is a 28 y.o. female who presents with dizziness. History obtained from the patient and her mother at bedside. As per patient initially went to [facility] ED for fever chills for 3 days with nausea vomiting a Covid test was negative there and was sent home. Patient keep on feeling dizzy and came back to [facility] ER. Initial code CTA CT showed no acute issue but there was a probability of some findings in the cerebellum MRI was ordered. Patient also history of fibroid and heavy menstrual bleeding she has symptomatic anemia and has a follow-up appointment with OB/GYN next Thursday at [place]. She complained of fever and not associated with  any burning with urination completed her menstrual cycle earlier today and usually has been taking Tylenol to control her fevers.  Patient states she has had 3-4 episodes of diarrhea yesterday and earlier today (no blood) and she had several episodes of vomiting yesterday with an increased number of episodes of vomiting today  Admitted for further management    Interval history / Subjective: Follow up MSSA bacteremia. Patient seen and examined. Mother at bedside. Mother voices multiple concerns regarding patient's care. She is concerned that patient has not been given appropriate treatment in the hospital. She voices complaints regarding IV access, changes in plan, multiple labs drawn, further tests being ordered. She states that she has lost all trust in the hospital. Also concerned that patient has been questioned regarding possible exposure to COVID, IV drug, and HIV.     Mother also questions the reason patient was given decadron and keppra at the beginning of her hospital stay. Explained that initial CT scan concerning for cerebellum mass and medications given prophylactically, then  discontinued after MRI brain confirmed diagnosis. Discussed multiple other concerns and addressed/explained role of hospitalization/diagnosis/treatment plans. Patient reports that she feels that she is being treated like a \"lab rat. \" She has new rash on feet that is painful to touch. Also has swelling in hands and feet. She has persistent fevers, temp 101.5. Blood cultures from 4/9 no growth to date. Patient refuses further work up including labs, central line, CT scan at this time. CT scan previously ordered on 4/9. Requests to speak with infectious disease. Nursing supervisor, Kenyatta Graf, was present in the room for encounter for >40 minutes. 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 -MRI with numerous supratentorial and infratentorial acute infarcts, largest in the left superior cerebellum, consistent with embolic etiology  -Blood cultures 4/6 with MSSA. Repeat blood cultures 4/7 with 2/4 staph aureus. Repeat blood cultures 4/9 no growth to date  -Continue cefazolin monotherapy.  ID following   -BRITTA 4/8 negative   -CT chest/abdomen/pelvis with contrast pending since 4/9     Chronic blood loss anemia due to main heavy menstrual bleed  -s/p 1 unit pRBCS, venofer   -Patient has a follow-up with OB/GYN as an outpatient at [place] next week    Hypokalemia: repleted    Nausea vomiting diarrhea: resolved    Elevated troponin:  -suspected to demand supply mismatch from low hemoglobin    Code status:full   DVT prophylaxis: scd    PT/OT    Patient with persistent fevers. Cultures negative since 4/9. Needs further work up. She also needs labs but has limited vessel access. Recommended CVL (unable to get PICC due to persistent fevers). Risks/benefits of CVL have been discusses. Also recommend further CT chest/abdomen/pelvis. Care Plan discussed with: Patient/Family and Nurse  Anticipated Disposition: Home w/Family  Anticipated Discharge: >48 hours     Hospital Problems  Never Reviewed          Codes Class Noted POA    Stroke (cerebrum) [facility]) ICD-10-CM: I63.9  ICD-9-CM: 434.91  4/7/2021 Unknown        Dizziness ICD-10-CM: R42  ICD-9-CM: 780.4  4/6/2021 Unknown                Review of Systems:   Negative unless stated above      Vital Signs:    Last 24hrs VS reviewed since prior progress note. Most recent are:  Visit Vitals  /70 (BP 1 Location: Right arm, BP Patient Position: At rest)   Pulse 92   Temp (!) 101.5 °F (38.6 °C)   Resp 10   Ht 5' 7\" (1.702 m)   Wt 73.9 kg (162 lb 14.4 oz)   SpO2 99%   BMI 25.51 kg/m²         Intake/Output Summary (Last 24 hours) at 4/11/2021 3959  Last data filed at 4/11/2021 0400  Gross per 24 hour   Intake 1551 ml   Output    Net 1551 ml        Physical Examination:     I had a face to face encounter with this patient and independently examined them on 4/11/2021 as outlined below:          Constitutional:  No acute distress, tired appearing    ENT:  Oral mucosa moist, oropharynx benign. Resp:  CTA bilaterally. No wheezing/rhonchi/rales. No accessory muscle use   CV:  Regular rhythm, normal rate, no murmurs, gallops, rubs    GI:  Soft, non distended, non tender.  normoactive bowel sounds    Musculoskeletal:  non-pitting edema surrounding wrists and ankles, warm, 2+ pulses throughout  Skin: papular tender nodules on finger/soles of feet     Neurologic:  no focal deficits with overall generalized weakness in all 4 extremities, subjective dizziness upon standing Data Review:    I personally reviewed  Image and labs    No results found. Labs:     No results for input(s): WBC, HGB, HCT, PLT, HGBEXT, HCTEXT, PLTEXT, HGBEXT, HCTEXT, PLTEXT in the last 72 hours. Recent Labs     04/09/21  1725      K 2.8*   *   CO2 20*   BUN 13   CREA 0.79   GLU 90   CA 7.1*     No results for input(s): ALT, AP, TBIL, TBILI, TP, ALB, GLOB, GGT, AML, LPSE in the last 72 hours. No lab exists for component: SGOT, GPT, AMYP, HLPSE  No results for input(s): INR, PTP, APTT, INREXT, INREXT in the last 72 hours. No results for input(s): FE, TIBC, PSAT, FERR in the last 72 hours. No results found for: FOL, RBCF   No results for input(s): PH, PCO2, PO2 in the last 72 hours. No results for input(s): CPK, CKNDX, TROIQ in the last 72 hours.     No lab exists for component: CPKMB  Lab Results   Component Value Date/Time    Cholesterol, total 68 04/06/2021 08:33 AM    HDL Cholesterol 17 04/06/2021 08:33 AM    LDL, calculated 16.6 04/06/2021 08:33 AM    Triglyceride 172 (H) 04/06/2021 08:33 AM    CHOL/HDL Ratio 4.0 04/06/2021 08:33 AM     Lab Results   Component Value Date/Time    Glucose (POC) 110 (H) 04/06/2021 05:08 AM     Lab Results   Component Value Date/Time    Color YELLOW/STRAW 04/05/2021 05:19 AM    Appearance CLEAR 04/05/2021 05:19 AM    Specific gravity 1.025 04/05/2021 05:19 AM    pH (UA) 5.5 04/05/2021 05:19 AM    Protein 100 (A) 04/05/2021 05:19 AM    Glucose Negative 04/05/2021 05:19 AM    Ketone Negative 04/05/2021 05:19 AM    Urobilinogen 1.0 04/05/2021 05:19 AM    Nitrites Negative 04/05/2021 05:19 AM    Leukocyte Esterase Negative 04/05/2021 05:19 AM    Epithelial cells MODERATE (A) 04/05/2021 05:19 AM    Bacteria 1+ (A) 04/05/2021 05:19 AM    WBC 5-10 04/05/2021 05:19 AM    RBC 0-5 04/05/2021 05:19 AM         Medications Reviewed:     Current Facility-Administered Medications   Medication Dose Route Frequency    hydrocortisone (ANUSOL-HC) 2.5 % rectal cream PeriANAL DAILY PRN    ceFAZolin (ANCEF) 2 g in sterile water (preservative free) 20 mL IV syringe  2 g IntraVENous Q8H    0.9% sodium chloride infusion 250 mL  250 mL IntraVENous PRN    sodium chloride (NS) flush 5-40 mL  5-40 mL IntraVENous Q8H    sodium chloride (NS) flush 5-40 mL  5-40 mL IntraVENous PRN    0.9% sodium chloride infusion  100 mL/hr IntraVENous CONTINUOUS    atorvastatin (LIPITOR) tablet 40 mg  40 mg Oral DAILY    acetaminophen (TYLENOL) tablet 650 mg  650 mg Oral Q4H PRN    aspirin chewable tablet 81 mg  81 mg Oral DAILY     ______________________________________________________________________  EXPECTED LENGTH OF STAY: 4d 9h  ACTUAL LENGTH OF STAY:          [address], [place]

## 2021-04-11 NOTE — PROGRESS NOTES
2030- Called CT to confirm pt can be transported down. Asked if pt has been NPO for 4 hours prior to imaging. Pt stated she ate dinner around 1830, also inquiring if contrast will be used for CT. Informed order says contrast will be used. Pt stating she \"tried contrast last time and became nauseated. \" Educated that contrast will be given via midline. Pt states she was under the impression no contrast would be used and does not wish to go to CT tonight. 2048- Called CT to confirm contrast will be used, told note says \"yes. \"     2050- Informed pt CT will be using contrast and can be performed anytime tonight, I would take her to CT. Would like to go to CT at 1000 (4/11), asked if would be willing to go at 0500 in order to have breakfast, declined. Pt says she will not have breakfast and would like to go at 1000 on 4/11. Tami Veras, NP. NPO order placed. 2300- Concerns regarding BLE edema. Assessed non-pitting. Informed/educated has been in bed and can be related to fluid shift, working with PT/OT and sitting in chair. Martín king placed. 0159- Gave Ancef, pt refusing labs. Educated on importance of monitoring lab results, still refusing. 65- Mother expressing concern regarding pt's care. Called nursing supervisors, wants ID off the case. Declining all labs and CT scans at this time. Mother expressing daughter would like her to be her proxy. 36- Nursing supervisors speaking with pt's mother. Mother would like to see hospital . 0- Pt's mother informs nursing staff pt feels she had a stroke yesterday 4/10 when working with PT/OT but did not inform staff. Symptoms during PT/OT included a headache and period of blurred vision. Shelia Reeves NP, notified. Switching NPO order back due to pt refusing CT of abd/pelvis. No neuro deficits on assessment. Bedside and Verbal shift change report given to Bogdan Nelson RN (oncoming nurse) by Slade Vazquez RN (offgoing nurse).  Report included the following information SBAR, Kardex, MAR, Cardiac Rhythm NSR and Quality Measures.

## 2021-04-11 NOTE — PROGRESS NOTES
Patient and mother agreed to CT scan of chest, abdomen and pelvis during conversation with Dr. Ethel Claros. Haley in CT was contacted by primary nurse while in patient's room. Haley confirmed with primary nurse that patient was acceptable to receive IV contrast dye. Patient stated she was acceptable. Haley stated that CT could see the patient now. Patient and mother refused to go stated \"they want to go later. \"  Patient's mother wanted primary nurse to contact a nurse to do lab draws first, per discussion with nurse and Dr. Ethel Claros. Patient and mother said they wanted someone who could get the patient's lab draws on the very first try. Dr. Ethel Claros suggested a CCU nurse, who was contacted while in the patient's room, but was unavailable due to patient load/assignment. 1500 Dr. Stephani Chiang, anesthesiologist, was contacted per conversation with patient and patient's mother, as a resource to obtain the patient's labs. Dr. Stephani Chiang stated he was beginning a procedure and that if no other resource was available, to try him in a few hours and he will try to obtain labs. [phone] Patient and patient's mother informed charge nurse that the patient would like to be transferred to [facility]. Dr. Ethel Claros informed. She will call [facility].

## 2021-04-11 NOTE — PROGRESS NOTES
Hospitalist Progress Note Update:     Recalled to bedside due to concerns of swelling in lower back. Had patient lie on her side, on flat bed. Palpated entire spine without appreciation of swelling. No identified areas of tenderness. Patient defers CVL placement. Patient agreeable to have staff member attempt labs but would prefer same person every day and only experienced provider. Discussed shift work in hospital and changing staff. Unlikely to be able to have same person available for lab draw. Will ask ICU nurse to attempt labs. Patient hesitant but agreeable to CT scan. Reports inability to walk. Discussed with ID and recommend further evaluation by neurology. Consult placed. Patient's mother considering transfer to another hospital. Discussed process of transfer per family request and would assist in this process if requested.          Beck Renteria

## 2021-04-11 NOTE — PROGRESS NOTES
Problem: Falls - Risk of  Goal: *Absence of Falls  Description: Document Hayley Fernandez Fall Risk and appropriate interventions in the flowsheet.   Outcome: Progressing Towards Goal  Note: Fall Risk Interventions:  Mobility Interventions: PT Consult for mobility concerns, PT Consult for assist device competence, OT consult for ADLs, Utilize walker, cane, or other assistive device         Medication Interventions: Bed/chair exit alarm, Patient to call before getting OOB    Elimination Interventions: Bed/chair exit alarm, Stay With Me (per policy), Toileting schedule/hourly rounds              Problem: TIA/CVA Stroke: Day 2 Until Discharge  Goal: Nutrition/Diet  Outcome: Progressing Towards Goal  Goal: Medications  Outcome: Progressing Towards Goal  Goal: Respiratory  Outcome: Progressing Towards Goal  Goal: *Absence of aspiration  Outcome: Progressing Towards Goal  Goal: *Absence of deep venous thrombosis signs and symptoms(Stroke Metric)  Outcome: Progressing Towards Goal     Problem: Ischemic Stroke: Discharge Outcomes  Goal: *Absence of aspiration pneumonia  Outcome: Progressing Towards Goal

## 2021-04-11 NOTE — PROGRESS NOTES
Dr. Ebenezer Goyal paged about patient's progression of swelling of extremities which the mother and patient believe could be related to an allergic reaction to ancef. Dr. Ebenezer Goyal gave orders to cancel the ancef and ordered nafcillin 2g IV q 4. Will continue to monitor.

## 2021-04-12 LAB
ALBUMIN SERPL-MCNC: 1.9 G/DL (ref 3.5–5)
ALBUMIN/GLOB SERPL: 0.6 {RATIO} (ref 1.1–2.2)
ALP SERPL-CCNC: 111 U/L (ref 45–117)
ALT SERPL-CCNC: 32 U/L (ref 12–78)
ANION GAP SERPL CALC-SCNC: 9 MMOL/L (ref 5–15)
AST SERPL-CCNC: 56 U/L (ref 15–37)
BACTERIA SPEC CULT: ABNORMAL
BACTERIA SPEC CULT: ABNORMAL
BASOPHILS # BLD: 0 K/UL (ref 0–0.1)
BASOPHILS NFR BLD: 0 % (ref 0–1)
BILIRUB SERPL-MCNC: 1.2 MG/DL (ref 0.2–1)
BUN SERPL-MCNC: 9 MG/DL (ref 6–20)
BUN/CREAT SERPL: 14 (ref 12–20)
CALCIUM SERPL-MCNC: 7.6 MG/DL (ref 8.5–10.1)
CHLORIDE SERPL-SCNC: 112 MMOL/L (ref 97–108)
CO2 SERPL-SCNC: 22 MMOL/L (ref 21–32)
COMMENT, 511217: NORMAL
CREAT SERPL-MCNC: 0.65 MG/DL (ref 0.55–1.02)
DIFFERENTIAL METHOD BLD: ABNORMAL
EOSINOPHIL # BLD: 0.1 K/UL (ref 0–0.4)
EOSINOPHIL NFR BLD: 1 % (ref 0–7)
ERYTHROCYTE [DISTWIDTH] IN BLOOD BY AUTOMATED COUNT: 20.1 % (ref 11.5–14.5)
F5 GENE MUT ANL BLD/T: NORMAL
GLOBULIN SER CALC-MCNC: 3.4 G/DL (ref 2–4)
GLUCOSE SERPL-MCNC: 98 MG/DL (ref 65–100)
HCT VFR BLD AUTO: 21 % (ref 35–47)
HGB BLD-MCNC: 6.6 G/DL (ref 11.5–16)
IMM GRANULOCYTES # BLD AUTO: 0.1 K/UL (ref 0–0.04)
IMM GRANULOCYTES NFR BLD AUTO: 1 % (ref 0–0.5)
INR PPP: 1.2 (ref 0.9–1.1)
LACTATE SERPL-SCNC: 0.9 MMOL/L (ref 0.4–2)
LYMPHOCYTES # BLD: 1 K/UL (ref 0.8–3.5)
LYMPHOCYTES NFR BLD: 8 % (ref 12–49)
MAGNESIUM SERPL-MCNC: 2 MG/DL (ref 1.6–2.4)
MCH RBC QN AUTO: 23.3 PG (ref 26–34)
MCHC RBC AUTO-ENTMCNC: 31.4 G/DL (ref 30–36.5)
MCV RBC AUTO: 74.2 FL (ref 80–99)
MONOCYTES # BLD: 0.6 K/UL (ref 0–1)
MONOCYTES NFR BLD: 5 % (ref 5–13)
NEUTS SEG # BLD: 10.5 K/UL (ref 1.8–8)
NEUTS SEG NFR BLD: 85 % (ref 32–75)
NRBC # BLD: 0 K/UL (ref 0–0.01)
NRBC BLD-RTO: 0 PER 100 WBC
PHOSPHATE SERPL-MCNC: 2.8 MG/DL (ref 2.6–4.7)
PLATELET # BLD AUTO: 204 K/UL (ref 150–400)
POTASSIUM SERPL-SCNC: 3 MMOL/L (ref 3.5–5.1)
PROT SERPL-MCNC: 5.3 G/DL (ref 6.4–8.2)
PROTHROMBIN TIME: 12.4 SEC (ref 9–11.1)
RBC # BLD AUTO: 2.83 M/UL (ref 3.8–5.2)
RBC MORPH BLD: ABNORMAL
SERVICE CMNT-IMP: ABNORMAL
SODIUM SERPL-SCNC: 143 MMOL/L (ref 136–145)
TROPONIN I SERPL-MCNC: 0.1 NG/ML
WBC # BLD AUTO: 12.3 K/UL (ref 3.6–11)

## 2021-04-12 PROCEDURE — 74011000258 HC RX REV CODE- 258: Performed by: INTERNAL MEDICINE

## 2021-04-12 PROCEDURE — 85610 PROTHROMBIN TIME: CPT

## 2021-04-12 PROCEDURE — 83735 ASSAY OF MAGNESIUM: CPT

## 2021-04-12 PROCEDURE — 65660000000 HC RM CCU STEPDOWN

## 2021-04-12 PROCEDURE — 85025 COMPLETE CBC W/AUTO DIFF WBC: CPT

## 2021-04-12 PROCEDURE — 74011250637 HC RX REV CODE- 250/637: Performed by: PSYCHIATRY & NEUROLOGY

## 2021-04-12 PROCEDURE — 97165 OT EVAL LOW COMPLEX 30 MIN: CPT

## 2021-04-12 PROCEDURE — 74011000250 HC RX REV CODE- 250: Performed by: INTERNAL MEDICINE

## 2021-04-12 PROCEDURE — 84100 ASSAY OF PHOSPHORUS: CPT

## 2021-04-12 PROCEDURE — 74011250637 HC RX REV CODE- 250/637: Performed by: NURSE PRACTITIONER

## 2021-04-12 PROCEDURE — 99233 SBSQ HOSP IP/OBS HIGH 50: CPT | Performed by: NURSE PRACTITIONER

## 2021-04-12 PROCEDURE — 83605 ASSAY OF LACTIC ACID: CPT

## 2021-04-12 PROCEDURE — 80053 COMPREHEN METABOLIC PANEL: CPT

## 2021-04-12 PROCEDURE — 94762 N-INVAS EAR/PLS OXIMTRY CONT: CPT

## 2021-04-12 PROCEDURE — 84484 ASSAY OF TROPONIN QUANT: CPT

## 2021-04-12 PROCEDURE — 97530 THERAPEUTIC ACTIVITIES: CPT

## 2021-04-12 PROCEDURE — 36415 COLL VENOUS BLD VENIPUNCTURE: CPT

## 2021-04-12 PROCEDURE — 74011250637 HC RX REV CODE- 250/637: Performed by: INTERNAL MEDICINE

## 2021-04-12 RX ORDER — ENOXAPARIN SODIUM 100 MG/ML
40 INJECTION SUBCUTANEOUS EVERY 24 HOURS
Status: DISCONTINUED | OUTPATIENT
Start: 2021-04-12 | End: 2021-04-13 | Stop reason: HOSPADM

## 2021-04-12 RX ORDER — POTASSIUM CHLORIDE 750 MG/1
20 TABLET, FILM COATED, EXTENDED RELEASE ORAL ONCE
Status: COMPLETED | OUTPATIENT
Start: 2021-04-12 | End: 2021-04-13

## 2021-04-12 RX ORDER — POTASSIUM CHLORIDE 750 MG/1
40 TABLET, FILM COATED, EXTENDED RELEASE ORAL
Status: COMPLETED | OUTPATIENT
Start: 2021-04-12 | End: 2021-04-12

## 2021-04-12 RX ADMIN — NAFCILLIN SODIUM 2 G: 2 INJECTION, POWDER, LYOPHILIZED, FOR SOLUTION INTRAMUSCULAR; INTRAVENOUS at 20:13

## 2021-04-12 RX ADMIN — NAFCILLIN SODIUM 2 G: 2 INJECTION, POWDER, LYOPHILIZED, FOR SOLUTION INTRAMUSCULAR; INTRAVENOUS at 16:44

## 2021-04-12 RX ADMIN — NAFCILLIN SODIUM 2 G: 2 INJECTION, POWDER, LYOPHILIZED, FOR SOLUTION INTRAMUSCULAR; INTRAVENOUS at 10:14

## 2021-04-12 RX ADMIN — ASPIRIN 81 MG: 81 TABLET, CHEWABLE ORAL at 10:15

## 2021-04-12 RX ADMIN — NAFCILLIN SODIUM 2 G: 2 INJECTION, POWDER, LYOPHILIZED, FOR SOLUTION INTRAMUSCULAR; INTRAVENOUS at 13:41

## 2021-04-12 RX ADMIN — Medication 10 ML: at 06:23

## 2021-04-12 RX ADMIN — NAFCILLIN SODIUM 2 G: 2 INJECTION, POWDER, LYOPHILIZED, FOR SOLUTION INTRAMUSCULAR; INTRAVENOUS at 04:26

## 2021-04-12 RX ADMIN — POTASSIUM CHLORIDE 40 MEQ: 750 TABLET, FILM COATED, EXTENDED RELEASE ORAL at 19:11

## 2021-04-12 RX ADMIN — ACETAMINOPHEN 650 MG: 325 TABLET, FILM COATED ORAL at 19:20

## 2021-04-12 RX ADMIN — ACETAMINOPHEN 650 MG: 325 TABLET, FILM COATED ORAL at 06:23

## 2021-04-12 NOTE — PROGRESS NOTES
Problem: Self Care Deficits Care Plan (Adult)  Goal: *Acute Goals and Plan of Care (Insert Text)  Description: FUNCTIONAL STATUS PRIOR TO ADMISSION: Patient was independent and active without use of DME. 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 HOME SUPPORT PRIOR TO ADMISSION: The patient lived alone and mother lives in [address] [place] [address] and she plans to have patient return to her home during recovery. Occupational Therapy Goals  Initiated 4/12/2021  1. Patient will perform grooming standing at sink with supervision/set-up within 7 day(s). 2.  Patient will perform lower body dressing with supervision/set-up within 7 day(s). 3.  Patient will perform bathing with supervision/set-up within 7 day(s). 4.  Patient will perform toilet transfers with supervision/set-up within 7 day(s). 5.  Patient will perform all aspects of toileting with supervision/set-up within 7 day(s). 6.  Patient will participate in upper extremity therapeutic exercise/activities with supervision/set-up for 10 minutes within 7 day(s). 7.  Patient will complete Fugl Romero motor assessment within 7 days. Outcome: Not Met    OCCUPATIONAL THERAPY EVALUATION  Patient: Sparkle Clark (21 y.o. female)  Date: 4/12/2021  Primary Diagnosis: Dizziness [R42]  Stroke (cerebrum) ([facility]) [I63.9]        Precautions: Fall    ASSESSMENT  Based on the objective data described below, the patient presents with mild LUE ataxia, non-focal weakness, and impaired activity tolerance s/p admission for CVA with brain MRI positive for numerous acute infarcts, largest in the left superior cerebellum. Patient was agreeable to EOB assessment with encouragement and demonstrated intact visual functions but reports she has had transient R visual loss during admission. Patient with mild b/l hand edema (reports improving) as well as R>L skin irritation resulting in pain/ discomfort and reduced b/l  effort.   Patient demonstrated mild LUE ataxia with FTN and object manipulation but coordination still functional with intact AROM. Further evaluation and Fugl-Romero UE motor assessment limited by patient needing to use toilet, and she asked this male therapist to leave the room (expressed preference for female assistance for ADLs). Patient reports she has been mobilizing to/from [facility] with her mother's assistance, who was present and supportive. Noted in PT evaluation on 4/10/21, patient ambulated 6' with minimum assistance using RW and today reports she has not progressed mobility further. Patient is below her independent baseline s/p acute CVA. Discharge recommendation TBD pending further assessment and progress. Current Level of Function Impacting Discharge (ADLs/self-care): infer up to minimum assistance    Functional Outcome Measure: Unable to complete Fugl Romero UE motor assessment at this time. Recommend follow-up. Patient scored 55/100 on Barthel Index, indicating ~45% impairment in functional performance    Patient will benefit from skilled therapy intervention to address the above noted impairments. PLAN :  Recommendations and Planned Interventions: self care training, functional mobility training, therapeutic exercise, balance training, therapeutic activities, endurance activities, neuromuscular re-education, patient education, home safety training, and family training/education    Frequency/Duration: Patient will be followed by occupational therapy 5 times a week to address goals. Recommendation for discharge: (in order for the patient to meet his/her long term goals)  To be determined: pending further assessment and progress    This discharge recommendation:  Has been made in collaboration with the attending provider and/or case management         SUBJECTIVE:   Patient stated [facility] vision is okay right now.     OBJECTIVE DATA SUMMARY:   HISTORY:   History reviewed. No pertinent past medical history. No past surgical history on file. Expanded or extensive additional review of patient history:     Home Situation  Home Environment: Apartment(elevator lives in [address] [place] [address])  [address] [place] [address] St: One story  Living Alone: Yes  Support Systems: Parent  Current DME Used/Available at Home: None    Hand dominance: Right    EXAMINATION OF PERFORMANCE DEFICITS:  Cognitive/Behavioral Status:  Neurologic State: Alert     Cognition: Follows commands  Perception: Appears intact  Perseveration: No perseveration noted  Safety/Judgement: Awareness of environment; Insight into deficits    Skin: visible skin appears intact    Edema: mild b/l hand    Hearing: WDL    Vision/Perceptual:    Tracking: Able to track stimulus in all quadrants w/o difficulty              Visual Fields: (able to detect in all fields, also intact peripheral)       Acuity: Within Defined Limits    Corrective Lenses: Glasses(not present)    Range of Motion:    AROM: Within functional limits                         Strength:    Strength: Generally decreased, functional                Coordination:  Coordination: Generally decreased, functional(mild LUE ataxia but functional)  Fine Motor Skills-Upper: Left Impaired;Right Intact    Gross Motor Skills-Upper: Left Impaired;Right Intact    Tone & Sensation:    Tone: Normal  Sensation: Intact           Balance:  Sitting: Intact      ADL Assessment:  Feeding: Independent(inferred)    Oral Facial Hygiene/Grooming: Setup(inferred)    Bathing: Minimum assistance(inferred for steadying, activity tolerance)    Upper Body Dressing: Setup(inferred)    Lower Body Dressing: Minimum assistance(inferred for steadying and activity tolerance)    Toileting: Minimum assistance(inferred)                ADL Intervention and task modifications:          Cognitive Retraining  Safety/Judgement: Awareness of environment; Insight into deficits    Functional Measure:    Barthel Index:    Bathin  Bladder: 10  Bowels: 10  Grooming: 5  Dressin  Feeding: 10  Mobility: 0  Stairs: 0  Toilet Use: 5  Transfer (Bed to Chair and Back): 10  Total: 55/100        The Barthel ADL Index: Guidelines  1. The index should be used as a record of what a patient does, not as a record of what a patient could do. 2. The main aim is to establish degree of independence from any help, physical or verbal, however minor and for whatever reason. 3. The need for supervision renders the patient not independent. 4. A patient's performance should be established using the best available evidence. Asking the patient, friends/relatives and nurses are the usual sources, but direct observation and common sense are also important. However direct testing is not needed. 5. Usually the patient's performance over the preceding 24-48 hours is important, but occasionally longer periods will be relevant. 6. Middle categories imply that the patient supplies over 50 per cent of the effort. 7. Use of aids to be independent is allowed. Jenae Caballero., Barthel, D.W. (1704). Functional evaluation: the Barthel Index. [address] [facility] (14)2. GLORIA Ramos, Alton Tucker., Saida Diaz., [place], [address] [place] [address] (). Measuring the change indisability after inpatient rehabilitation; comparison of the responsiveness of the Barthel Index and Functional Story Measure. Journal of Neurology, Neurosurgery, and Psychiatry, 66(4), 786-636. Santos Quiroz, N.J.A, KEVIN IrwinJ.STANTON, & Chris Bennett MELSY. (2004.) Assessment of post-stroke quality of life in cost-effectiveness studies: The usefulness of the Barthel Index and the EuroQoL-5D.  Quality of Life Research, 15, 540-29         Occupational Therapy Evaluation Charge Determination   History Examination Decision-Making   LOW Complexity : Brief history review  MEDIUM Complexity : 3-5 performance deficits relating to physical, cognitive , or psychosocial skils that result in activity limitations and / or participation restrictions MEDIUM Complexity : Patient may present with comorbidities that affect occupational performnce. Miniml to moderate modification of tasks or assistance (eg, physical or verbal ) with assesment(s) is necessary to enable patient to complete evaluation       Based on the above components, the patient evaluation is determined to be of the following complexity level: LOW   Pain Rating:  Patient did not report pain    Activity Tolerance:   VSS, fair    After treatment patient left in no apparent distress:    Sitting EOB, intending to mobilize to [facility] with mother's assistance. RN notified. Call bell left within reach    COMMUNICATION/EDUCATION:   The patients plan of care was discussed with: Physical therapist, Registered nurse, Physician, and Case management. Patient was educated regarding her deficit(s) of LUE ataxia as this relates to her diagnosis of CVA. She demonstrated Good understanding as evidenced by verbal response. Patient and/or family was verbally educated on the BE FAST acronym for signs/symptoms of CVA and TIA. Provided with BEFAST handout. All questions answered with patient indicating good understanding. Home safety education was provided and the patient/caregiver indicated understanding., Patient/family have participated as able in goal setting and plan of care. , and Patient/family agree to work toward stated goals and plan of care. This patients plan of care is appropriate for delegation to [facility].     Thank you for this referral.  Chelsey Markham OT  Time Calculation: 32 mins

## 2021-04-12 NOTE — PROGRESS NOTES
Problem: Falls - Risk of  Goal: *Absence of Falls  Description: Document Liborio Keller Fall Risk and appropriate interventions in the flowsheet. Outcome: Progressing Towards Goal  Note: Fall Risk Interventions:  Mobility Interventions: Bed/chair exit alarm         Medication Interventions: Bed/chair exit alarm    Elimination Interventions: Call light in reach              Problem: Patient Education: Go to Patient Education Activity  Goal: Patient/Family Education  Outcome: Progressing Towards Goal     Problem: Pressure Injury - Risk of  Goal: *Prevention of pressure injury  Description: Document Werner Scale and appropriate interventions in the flowsheet.   Outcome: Progressing Towards Goal  Note: Pressure Injury Interventions:  Sensory Interventions: Assess changes in LOC, Avoid rigorous massage over bony prominences, Discuss PT/OT consult with provider, Minimize linen layers         Activity Interventions: Increase time out of bed, PT/OT evaluation    Mobility Interventions: HOB 30 degrees or less, PT/OT evaluation    Nutrition Interventions: Document food/fluid/supplement intake

## 2021-04-12 NOTE — PROGRESS NOTES
Transition of Care Plan   RUR- 12 % Low Risks    DISPOSITION: TBD pending medical stability and care recommendations                     - patient requesting transfer to [facility] neurology department for further work-up; no bed available per attending    F/U with PCP/Specialist     Transport: TBD at discharge      CM received a verbal request from patient's mother Lexi Donahue to discuss transfer to [facility] for further work-up. Patient's mother informed that Dr. John Paul Reyes had already called [facility] and no bed available. Reviewed chart for transitions of care. Discussed the case with Dr. John Paul Reyes. She informed that a transfer request was made with [facility] over the weekend but there is no bed available. CM offered to assist with transfer to [facility] facility but patient and her mother declined. Noted ID is following and indicating that patient may need 4-6 weeks of IV abx therapy pending blood culture result. Attending noted patient not being able to get PICC due to persistent fevers and has been recommended CVL. CM will continue to follow for GRAZYNA needs and assist accordingly.      LAURA Youssef

## 2021-04-12 NOTE — PROGRESS NOTES
Bedside and Verbal shift change report given to Greg Barrientos RN (oncoming nurse) by Yamini Lai RN (offgoing nurse). Report included the following information SBAR, Kardex, Intake/Output, MAR, Recent Results and Cardiac Rhythm NSR.

## 2021-04-12 NOTE — PROGRESS NOTES
I called patient in her room to discuss lab results. We reviewed low potassium, normal renal function, elevated WBC, and low Hgb. I recommended 1 unit pRBCs due to Hgb 6.6. Patient declined and stated that she already received a transfusion earlier in the hospitalization. I reviewed that transfusion is recommended in patient's with Hgb <7 and this is a standard medical recommendation. She agreed to morning labs through her midline (now functioning and able to draw labs).        Ale Ochoa, DO

## 2021-04-12 NOTE — PROGRESS NOTES
ID Progress Note  2021    Subjective:     She states that she is feeling better since changing to nafcillin. She is still reluctant to have blood drawn, doesn't wish to have CT, etc. Long discussion with her and her mother--re: need for blood draws to monitor potassium, blood cultures for fever, etc. Her mother is concerned that she will get pneumonia if the patient isn't covered up, but she thinks that a fever will develop if she is too covered up--I explained that is not how a fever and pneumonia develop, but she was skeptical. We did agree to have blood cultures done if her temp goes over 100.4, however. Objective:     Antibiotics:  1. Cefazolin       Vitals:   Visit Vitals  /66 (BP 1 Location: Right arm, BP Patient Position: At rest)   Pulse 86   Temp 100.2 °F (37.9 °C)   Resp 22   Ht 5' 7\" (1.702 m)   Wt 71.3 kg (157 lb 1.6 oz)   SpO2 100%   BMI 24.61 kg/m²        Tmax:  Temp (24hrs), Av.9 °F (37.7 °C), Min:99.1 °F (37.3 °C), Max:101.4 °F (38.6 °C)      Exam:  Lungs:  clear to auscultation bilaterally  Heart:  regular rate and rhythm  Abdomen:  soft, non-tender. Bowel sounds normal. No masses,  no organomegaly  Few ?vasculitic lesions on feet--improved    Labs:      Recent Labs     21  1450 21  1725   WBC 12.3*  --    HGB 6.6*  --      --    BUN 9 13   CREA 0.65 0.79     --    TBILI 1.2*  --        Cultures:     No results found for: SDES  Lab Results   Component Value Date/Time    Culture result: NO GROWTH 3 DAYS 2021 06:00 PM    Culture result: (A) 2021 09:10 AM     STAPHYLOCOCCUS AUREUS GROWING IN 2 OF 4 BOTTLES DRAWN (SITE = L UPPER ARM) (PLEASE REFER TO [facility] [id number] FOR SENSITIVITIES)    Culture result: REMAINING BOTTLE(S) HAS/HAVE NO GROWTH IN 5 DAYS 2021 09:10 AM       Radiology:     Line/Insert Date:           Assessment:     1.  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 Certainly, other sources are possible, as well. 2. Anemia--persistent and significant  3. CVA/embolic events    Objective:     1. Continue current therapy  2. Cultures until negative--4/9 cultures negative to date  3. Plan on 4-6 weeks IV antibiotic therapy  4.  D/W patient, her mother and Dr Harika Hernandez MD

## 2021-04-12 NOTE — PROGRESS NOTES
Neurology Progress Note  Ladoris Phoenix, NP      Date of admission: 4/6/2021    Patient: Nichole Desai MRN: [id number]  SSN: [id number]    YOB: 1985  Age: 28 y.o. Sex: female        Subjective:     HPI: Nichole Desai is a 28 y.o. female presented to the ED 4/3/21 with BP of 103/52, , and temp of 103.1. Symptoms started on Friday, 04/02/21, fever, nausea, and vomiting and she. Work up did not lead to any obvious etiology and pt was discharged. She returned on 04/05/21 with fever and vomiting, again no etiology found. Presented 4/6 after an episode of slurring of her speech and left facial droop. CT head revealed questionable abnormality in the cerebellum and MRI brain subsequently confirmed bilateral cerebellar infarcts larger on the left greater than the right as well as bilateral cerebral punctate infarcts with a larger infarct in the right corona radiata. CTP revealed left cerebellar perfusion defect consistent with findings on MRI. CTA H&N was unremarkable. COVID testing again was negative. The patient denies history of stroke. No history of clotting disorder in patient or family. Pt had difficulty seeing out of her right eye at onset and had ptosis, and she has felt off balance with trouble walking, describing ataxia. No numbness, tingling, or weakness in her extremities. Hgb was 7.0 from 9.3 on 4/3 with h/o chronic difficulties with iron def anemia related to fibroids and heavy menses. Blood cx were positive x 4 bottles. Iron level low. CRP 12.6 on 4/3/21, repeat on 4/6 22.2. ESR 50. Hematology consulted due to retic count 0.2. Interval 04/12/21:     Pt did report that about a week before presentation she had RLQ abdominal pain and said she felt something \"pop\". She has no RLQ pain currently. She is refusing CT C/A/P w/ contrast as she doesn't want any more radiation.  She has no focal deficits or ataxia on exam. Patients reports some difficulty with balance with ambulation which is to be expected with the cerebellar infarcts. Review of systems  Review of systems negative except as detailed in the HPI, interval, PMH and A&P    History reviewed. No pertinent past medical history. No past surgical history on file. History reviewed. No pertinent family history.   Social History     Tobacco Use    Smoking status: Not on file   Substance Use Topics    Alcohol use: Not on file      Prior to Admission medications    Not on File     Current Facility-Administered Medications   Medication Dose Route Frequency Provider Last Rate Last Admin    nafcillin (NALLPEN) 2 g in 0.9% sodium chloride (MBP/ADV) 100 mL MBP  2 g IntraVENous Q4H Davonte Alexander  mL/hr at 21 0426 2 g at 21 0426    hydrocortisone (ANUSOL-HC) 2.5 % rectal cream   PeriANAL DAILY PRN Javier EID, DO        0.9% sodium chloride infusion 250 mL  250 mL IntraVENous PRN Fredis Doherty MD        sodium chloride (NS) flush 5-40 mL  5-40 mL IntraVENous Q8H Fredis Doherty MD   10 mL at 21 2762    sodium chloride (NS) flush 5-40 mL  5-40 mL IntraVENous PRN Fredis Doherty MD        0.9% sodium chloride infusion  25 mL/hr IntraVENous CONTINUOUS Javiernicole EID DO 50 mL/hr at 21 1802 50 mL/hr at 21 1802    atorvastatin (LIPITOR) tablet 40 mg  40 mg Oral DAILY Fredis Doherty MD   40 mg at 21 1041    acetaminophen (TYLENOL) tablet 650 mg  650 mg Oral Q4H PRN Charbel Freire NP   650 mg at 21 4299    aspirin chewable tablet 81 mg  81 mg Oral DAILY Haile Huerta MD   81 mg at 21 1041        No Known Allergies    Objective:     Vitals:    21 2200 21 2319 21 0200 21 0600   BP: 113/62  (!) 104/59 (!) 114/53   Pulse: 92  81 88   Resp: 23  21 24   Temp: (!) 101.4 °F (38.6 °C) 99.8 °F (37.7 °C) 99.5 °F (37.5 °C) 100.1 °F (37.8 °C)   SpO2: 98%  99% 100%        Temp (24hrs), Av °F (37.8 °C), Min:99.5 °F (37.5 °C), Max:101.4 °F (38.6 °C)        O2 Device: None (Room air)       No intake or output data in the 24 hours ending 04/12/21 0803    General: In NAD. Cardiac: RRR  Lungs: Unlabored breathing. Abdomen: Soft/NT/non-distended. Extremities. Distal LE edema, worst in eugene feet, symmetric    Neurologic Exam:  Mental Status:  Alert and oriented x 4. Language:    Grossly intact fluency and comprehension    Cranial Nerves:   Pupils equal, round and reactive to light. Visual fields intact. Extraocular movements intact w/o nystagmus      Facial sensation intact to LT     Facial activation symmetric. Hearing grossly intact. No dysarthria. Shoulder shrug 5/5 bilaterally. Motor:    Bulk and tone normal.      5/5 strength in all extremities. No pronator drift. No involuntary movements. Sensation:    Sensation intact throughout to light touch. Coordination & Gait: No ataxia with finger to nose or heel to shin. Gait deferred    LABS:  No results for input(s): WBC, HGB, HCT, PLT, HGBEXT, HCTEXT, PLTEXT in the last 72 hours. Recent Labs     04/09/21  1725      K 2.8*   *   CO2 20*   BUN 13   CREA 0.79   GLU 90   CA 7.1*     No results for input(s): ALT, AP, TBIL, TBILI, TP, ALB, GLOB, GGT, AML, LPSE in the last 72 hours. No lab exists for component: SGOT, GPT, AMYP, HLPSE  No results for input(s): INR, PTP, APTT, INREXT in the last 72 hours. No results for input(s): PHI, PCO2I, PO2I, HCO3I in the last 72 hours. No results for input(s): CPK, CKNDX, TROIQ in the last 72 hours.     No lab exists for component: CPKMB  Lab Results   Component Value Date/Time    Cholesterol, total 68 04/06/2021 08:33 AM    HDL Cholesterol 17 04/06/2021 08:33 AM    LDL, calculated 16.6 04/06/2021 08:33 AM    Triglyceride 172 (H) 04/06/2021 08:33 AM    CHOL/HDL Ratio 4.0 04/06/2021 08:33 AM     Lab Results   Component Value Date/Time    Glucose (POC) 110 (H) 04/06/2021 05:08 AM     Lab Results   Component Value Date/Time    Color YELLOW/STRAW 04/05/2021 05:19 AM    Appearance CLEAR 04/05/2021 05:19 AM    Specific gravity 1.025 04/05/2021 05:19 AM    pH (UA) 5.5 04/05/2021 05:19 AM    Protein 100 (A) 04/05/2021 05:19 AM    Glucose Negative 04/05/2021 05:19 AM    Ketone Negative 04/05/2021 05:19 AM    Urobilinogen 1.0 04/05/2021 05:19 AM    Nitrites Negative 04/05/2021 05:19 AM    Leukocyte Esterase Negative 04/05/2021 05:19 AM    Epithelial cells MODERATE (A) 04/05/2021 05:19 AM    Bacteria 1+ (A) 04/05/2021 05:19 AM    WBC 5-10 04/05/2021 05:19 AM    RBC 0-5 04/05/2021 05:19 AM       No results for input(s): FE, TIBC, PSAT, FERR in the last 72 hours. No results found for: FOL, RBCF   No results for input(s): PH, PCO2, PO2 in the last 72 hours. No results for input(s): CPK, CKNDX, TROIQ in the last 72 hours. No lab exists for component: CPKMB    Imaging:  No results found. CT Results:  Results from Hospital Encounter encounter on 04/06/21   CT CODE NEURO PERF W CBF    Narrative *PRELIMINARY REPORT*    No significant perfusion defect. No acute thrombosis or significant intracranial  stenosis. Preliminary report was provided by Dr. Rosa Mirza, the on-call radiologist, at 5:11  AM.    Final report to follow. *END PRELIMINARY REPORT*    FINAL REPORT:    CLINICAL HISTORY: Slurred speech    EXAMINATION:  CT ANGIOGRAPHY HEAD AND NECK     COMPARISON: CT head 4/6/2021    TECHNIQUE:  Following the uneventful administration of iodinated contrast  material, axial CT angiography of the head and neck was performed. Delayed axial  images through the head were also obtained. Coronal and sagittal reconstructions  were obtained. Manual postprocessing of images was performed. 3-D  Sagittal  maximal intensity projection images were obtained. 3-D Coronal maximal  intensity projections were obtained.   CT dose reduction was achieved through use  of a standardized protocol tailored for this examination and automatic exposure  control for dose modulation. CT perfusion analysis was performed using CT with  contrast administration, including postprocessing of parametric maps with the  determination of cerebral blood flow, cerebral blood volume, and mean transit  time. This study was analyzed by the [address] [place], [place] [address]y [address]. ai algorithm    FINDINGS:    Delayed contrast-enhanced head CT:    The ventricles are midline without hydrocephalus. There is no acute intra or  extra-axial hemorrhage. Focal hypoattenuation in the left cerebellum. The basal  cisterns are clear. The paranasal sinuses are clear. CTA NECK:    Great vessels: Normal arch anatomy with the origins patent. Right subclavian artery: Patent    Left subclavian artery: Patent    Right common carotid artery: Patent    Left common carotid artery: Patent    Cervical right internal carotid artery: Patent with no significant stenosis by  NASCET criteria. Cervical left internal carotid artery: Patent with no significant stenosis by  NASCET criteria. Right vertebral artery: Patent    Left vertebral artery: Patent    Mild C5-6 degenerative disc disease    Subcentimeter low-density left thyroid nodule    CTA HEAD:    Right cavernous internal carotid artery: Patent    Left cavernous internal carotid artery: Patent    Anterior cerebral arteries: Patent    Anterior communicating artery: Patent    Right middle cerebral artery: Patent    Left middle cerebral artery: Patent    Posterior communicating arteries: Patent    Posterior cerebral arteries: Patent    Basilar artery: Patent    Distal vertebral arteries: Patent    CT perfusion brain: No evidence for ischemic penumbra. Small focal perfusion  abnormality in the left cerebellum of uncertain significance corresponding to  focal area of hypoattenuation in the left cerebellum. Measurements use NASCET criteria. Impression No evidence for acute large vessel arterial occlusion or significant stenosis.     No evidence for ischemic penumbra. Small focal perfusion abnormality in the left  cerebellum of uncertain significance corresponding to focal area of  hypoattenuation in the left cerebellum. Consider further evaluation with MR of  the brain             CTA CODE NEURO HEAD AND NECK W CONT    Narrative PRELIMINARY REPORT*    No significant perfusion defect. No acute thrombosis or significant intracranial  stenosis. Preliminary report was provided by Dr. Karon Lynne, the on-call radiologist, at 5:11  AM.    Final report to follow. *END PRELIMINARY REPORT*    FINAL REPORT:    CLINICAL HISTORY: Slurred speech    EXAMINATION:  CT ANGIOGRAPHY HEAD AND NECK     COMPARISON: CT head 4/6/2021    TECHNIQUE:  Following the uneventful administration of iodinated contrast  material, axial CT angiography of the head and neck was performed. Delayed axial  images through the head were also obtained. Coronal and sagittal reconstructions  were obtained. Manual postprocessing of images was performed. 3-D  Sagittal  maximal intensity projection images were obtained. 3-D Coronal maximal  intensity projections were obtained. CT dose reduction was achieved through use  of a standardized protocol tailored for this examination and automatic exposure  control for dose modulation. CT perfusion analysis was performed using CT with  contrast administration, including postprocessing of parametric maps with the  determination of cerebral blood flow, cerebral blood volume, and mean transit  time. This study was analyzed by the [address] ai algorithm    FINDINGS:    Delayed contrast-enhanced head CT:    The ventricles are midline without hydrocephalus. There is no acute intra or  extra-axial hemorrhage. Focal hypoattenuation in the left cerebellum. The basal  cisterns are clear. The paranasal sinuses are clear. CTA NECK:    Great vessels: Normal arch anatomy with the origins patent.     Right subclavian artery: Patent    Left subclavian artery: Patent    Right common carotid artery: Patent    Left common carotid artery: Patent    Cervical right internal carotid artery: Patent with no significant stenosis by  NASCET criteria. Cervical left internal carotid artery: Patent with no significant stenosis by  NASCET criteria. Right vertebral artery: Patent    Left vertebral artery: Patent    Mild C5-6 degenerative disc disease    Subcentimeter low-density left thyroid nodule    CTA HEAD:    Right cavernous internal carotid artery: Patent    Left cavernous internal carotid artery: Patent    Anterior cerebral arteries: Patent    Anterior communicating artery: Patent    Right middle cerebral artery: Patent    Left middle cerebral artery: Patent    Posterior communicating arteries: Patent    Posterior cerebral arteries: Patent    Basilar artery: Patent    Distal vertebral arteries: Patent    CT perfusion brain: No evidence for ischemic penumbra. Small focal perfusion  abnormality in the left cerebellum of uncertain significance corresponding to  focal area of hypoattenuation in the left cerebellum. Measurements use NASCET criteria. Impression No evidence for acute large vessel arterial occlusion or significant stenosis. No evidence for ischemic penumbra. Small focal perfusion abnormality in the left  cerebellum of uncertain significance corresponding to focal area of  hypoattenuation in the left cerebellum. Consider further evaluation with MR of  the brain             CT CODE NEURO HEAD WO CONTRAST    Narrative EXAM: CT CODE NEURO HEAD WO CONTRAST    INDICATION: speech slurred    COMPARISON: None. CONTRAST: None. TECHNIQUE: Unenhanced CT of the head was performed using 5 mm images. Brain and  bone windows were generated. Coronal and sagittal reformats. CT dose reduction  was achieved through use of a standardized protocol tailored for this  examination and automatic exposure control for dose modulation.       FINDINGS:  The ventricles and sulci are normal in size, shape and configuration. There are  areas of low attenuation in both cerebellar hemispheres, left greater than  right. . There is no intracranial hemorrhage, extra-axial collection, or mass  effect. The basilar cisterns are open. No CT evidence of acute infarct. The bone windows demonstrate no abnormalities. The visualized portions of the  paranasal sinuses and mastoid air cells are clear. Impression Areas of low-attenuation in both cerebellar hemispheres, left greater than  right, may represent old infarcts, although underlying mass lesions cannot be  excluded; consider further evaluation with contrast enhanced brain MRI. No  evidence of acute infarct or intracranial hemorrhage. The findings were called to Dr. Muller Press on 4/6/2021 at 4:40 AM by Dr. Jess Ordonez. [id number]             MRI Results:  Results from [place] encounter on 04/06/21   MRI BRAIN W WO CONT    Narrative EXAM:  MRI BRAIN W WO CONT    INDICATION:    cerebellar mass lesion    COMPARISON:  CTA head and neck 4/6/2021. CONTRAST: 12 ml Dotarem. TECHNIQUE:    Multiplanar multisequence acquisition without and with contrast of the brain. FINDINGS:  Moderate-sized acute infarcts in the left superior cerebellum, and multiple  smaller acute infarcts in the right mid and inferior cerebellum. There are  additional numerous punctate to small acute infarcts scattered throughout the  cerebral hemispheres, largest in the right corona radiata. There is small amount  of petechial hemorrhage noted associated with the acute cerebellar infarcts. The ventricles are normal in size and position. There is no extra-axial fluid  collection or mass effect. There is no cerebellar tonsillar herniation. Expected  arterial flow-voids are present. No evidence of abnormal enhancement. Scattered mucosal thickening in the left frontal sinus, bilateral ethmoidal air  cells, and left maxillary sinus without air-fluid level.  The mastoid air cells  and middle ears are clear. The orbital contents are within normal limits. No  significant osseous or scalp lesions are identified. Impression 1. Numerous supratentorial and infratentorial acute infarcts as above, largest  in the left superior cerebellum, most consistent with embolic etiology. XR Results   Results from [place] encounter on 04/03/21   XR CHEST PORT    Impression No acute cardiopulmonary process. VAS/US Results (maximum last 3): No results found for this or any previous visit. TTE  04/06/21   ECHO BIRTTA W OR WO CONTRAST 04/09/2021 4/9/2021    Narrative · LV: Estimated LVEF is 65 - 70%. Normal cavity size, wall thickness and   systolic function (ejection fraction normal). · IAS: No atrial septal defect present. Saline contrast was given to   evaluate for intracardiac shunt. · Pericardium: Small circumferential pericardial effusion. · No deinite embolic source identified        Signed by: Nathaniel Phoenix MD         EKG  Results for orders placed or performed during the hospital encounter of 04/06/21   EKG, 12 LEAD, INITIAL   Result Value Ref Range    Ventricular Rate 96 BPM    Atrial Rate 96 BPM    P-R Interval 154 ms    QRS Duration 74 ms    Q-T Interval 344 ms    QTC Calculation (Bezet) 434 ms    Calculated P Axis 72 degrees    Calculated R Axis 60 degrees    Calculated T Axis 78 degrees    Diagnosis       Normal sinus rhythm  Low voltage QRS  When compared with ECG of 03-APR-2021 19:07,  MANUAL COMPARISON REQUIRED, DATA IS UNCONFIRMED  Confirmed by Fabrice Francois MD ([id number]) on 4/6/2021 9:46:21 PM         Hospital Problems  Never Reviewed          Codes Class Noted POA    Stroke (cerebrum) ([facility] [address] ICD-10-CM: I63.9  ICD-9-CM: 434.91  4/7/2021 Unknown        Dizziness ICD-10-CM: R42  ICD-9-CM: 780.4  4/6/2021 Unknown              Assessment/Plan:     PTA antiplatelet: No  PTA AC: No  PTA statin: No  LDL: 16  ASA assay:  TRANS ESOPHAGEAL ECHO:  LVEF is 65 - 70%. No PFO or shunt.  No vegetation. No definite embolic source identified  EKG: NSR    A1C: 5.5    Shellie Lora is a 28 y.o. female presenting with a 3-day history of fever, nausea, and vomiting, presenting due to imbalance, dysarthria, left facial droop with the patient noting some difficulty seeing out of her right eye and right eye droop that was transient, found to have bilateral cerebellar and cerebral infarcts, the largest in the left cerebellum and right corona radiata. She has bacteremia with pan-sensitive staph aureus, 4/4 bottles on 4/6/21. Etiology unclear, though she did report that about a week before presentation she had RLQ abdominal pain and said she felt something \"pop\". She has no RLQ pain currently. She is refusing CT C/A/P w/ contrast as she doesn't want any more radiation. Given her embolic stroke in all 4 vascular territories, cardioembolic etiology is most likely. Cardiology was consulted. TTE and BRITTA were unremarkable and no definite source of emboli could be identified. Hypercoagulable workup with only mild derangements, likely acute response. She has no focal deficits or ataxia on exam. Patients reports some difficulty with balance with ambulation which is to be expected with the cerebellar infarct. She should recover from this deficit with time and rehab. Most likely cardio-embolic etiology give scattered distribution of infarcts in all 4 vascular territories; thrombus in the setting of hypercoagulability due to sepsis or septic emboli in the setting of bacteremia, though no evidence of thrombus or vegetation on BRITTA. Continue ASA 81 mg  LDL 16 - Statin not indicated  A1C within goal  Agree with heme consult to assess hypercoag labs and for other reasons listed in the consult. Treat underlying infection. Thank you for this consult.     Signed By: Natalie Sood NP     April 12, 2021 8:03 AM

## 2021-04-12 NOTE — PROGRESS NOTES
[facility] Adult  Hospitalist Group                                                                                          Hospitalist Progress Note  [facility],   Answering service: [phone] from in house phone        Date of Service:  2021  NAME:  Megan David  :  1985  MRN:  [id number]      Admission Summary:   Megan David is a 28 y.o. female who presents with dizziness. History obtained from the patient and her mother at bedside. As per patient initially went to [facility] ED for fever chills for 3 days with nausea vomiting a Covid test was negative there and was sent home. Patient keep on feeling dizzy and came back to [facility] ER. Initial code CTA CT showed no acute issue but there was a probability of some findings in the cerebellum MRI was ordered. Patient also history of fibroid and heavy menstrual bleeding she has symptomatic anemia and has a follow-up appointment with OB/GYN next Thursday at [place]. She complained of fever and not associated with  any burning with urination completed her menstrual cycle earlier today and usually has been taking Tylenol to control her fevers.  Patient states she has had 3-4 episodes of diarrhea yesterday and earlier today (no blood) and she had several episodes of vomiting yesterday with an increased number of episodes of vomiting today  Admitted for further management    Interval history / Subjective: Follow up MSSA bacteremia. Patient seen and examined. Spoke with patient alone while mother remained outside room. Patient reports improved symptoms with antibiotic switch from cefazolin to naficillin. She now feels that her eyesight is better and she can walk due to switching the antibiotic. She reports intermittent swelling in back, current resolved. Also with swelling in arms and legs.  Fever persistent with 101.4 overnight and 100.1 this AM. Has persistent and somewhat worsening rash     I had very honest discussion with patient that I feel her refusal of care including lab draws and CT scan is impacting her medical improvement. She has not had further work-up since Friday, 4/9, but remains febrile. Patient does not want \"two sticks\" and stated we had agreed to one stick the day prior. I discussed that patient had recurrent fevers and now would recommend paired blood cultures. At the end, we agreed to one stick although I have stated that my overall recommendations have not changed. She is also concerned about radiation exposure from CT scan. I discussed that I felt CT scan is medically necessary and my recommendation would not change. I reiterated that we need to be more aggressive as a medical team to understand the source of her infection and improve her current state. Patient remains hesitant regarding CT scan. I explained that she has full rights to refuse care but I will continue to provide my medical opinion regarding her diagnosis/treatment plan. She has requested transfer to [facility]. I notified transfer center but no beds available. Likelihood very low for transfer. Discussed other hospitals in [place] and could request transfer if desired. 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 S/p decadron, keppra on admission- now discontinued   -MRI with numerous supratentorial and infratentorial acute infarcts, largest in the left superior cerebellum, consistent with embolic etiology   -Blood cultures 4/6 with MSSA. Repeat blood cultures 4/7 with 2/4 MRSSA. Repeat blood cultures 4/9 no growth to date. Recommend repeat 4/12  -Continue naficillin monotherapy. S/p vancomycin, cefazolin.  ID following   -BRITTA 4/8 negative   -CT chest/abdomen/pelvis with contrast pending since 4/9     Chronic blood loss anemia due to main heavy menstrual bleed  -s/p 1 unit pRBCS, venofer   -Patient has a follow-up with OB/GYN as an outpatient at [place] next week    Hypokalemia: repleted    Nausea vomiting diarrhea: resolved    Elevated troponin:  -suspected to demand supply mismatch from low hemoglobin    Swelling:   -potentially multifactorial in setting of sepsis and volume repletion. Lower extremity dopplers negative     Code status: full   DVT prophylaxis: okay to restart lovenox per neurology    PT/OT    Care Plan discussed with: Patient/Family and Nurse  Anticipated Disposition: Home w/Family  Anticipated Discharge: >48 hours. No clear disposition due to persistent fevers     Hospital Problems  Never Reviewed          Codes Class Noted POA    Stroke (cerebrum) [facility]) ICD-10-CM: I63.9  ICD-9-CM: 434.91  4/7/2021 Unknown        Dizziness ICD-10-CM: R42  ICD-9-CM: 780.4  4/6/2021 Unknown                Review of Systems:   Negative unless stated above      Vital Signs:    Last 24hrs VS reviewed since prior progress note. Most recent are:  Visit Vitals  BP (!) 114/53 (BP 1 Location: Right upper arm, BP Patient Position: At rest)   Pulse 88   Temp 100.1 °F (37.8 °C)   Resp 24   Ht 5' 7\" (1.702 m)   Wt 71.3 kg (157 lb 1.6 oz)   SpO2 100%   BMI 24.61 kg/m²       No intake or output data in the 24 hours ending 04/12/21 1334     Physical Examination:     I had a face to face encounter with this patient and independently examined them on 4/12/2021 as outlined below:          Constitutional:  No acute distress, tired appearing    ENT:  Oral mucosa moist, oropharynx benign. Resp:  CTA bilaterally. No wheezing/rhonchi/rales. No accessory muscle use   CV:  Regular rhythm, normal rate, no murmurs, gallops, rubs    GI:  Soft, non distended, non tender.  normoactive bowel sounds    Musculoskeletal:  non-pitting swollen joints in legs and feet, warm, 2+ pulses throughout  Skin: macular papular erythematous nodes on palms and soles, papular lesions on toes and fingers      Neurologic:  no focal deficits with overall generalized weakness in all 4 extremities, subjective dizziness upon standing             Data Review:    I personally reviewed  Image and labs    No results found. Labs:     No results for input(s): WBC, HGB, HCT, PLT, HGBEXT, HCTEXT, PLTEXT, HGBEXT, HCTEXT, PLTEXT in the last 72 hours. Recent Labs     04/09/21  1725      K 2.8*   *   CO2 20*   BUN 13   CREA 0.79   GLU 90   CA 7.1*     No results for input(s): ALT, AP, TBIL, TBILI, TP, ALB, GLOB, GGT, AML, LPSE in the last 72 hours. No lab exists for component: SGOT, GPT, AMYP, HLPSE  No results for input(s): INR, PTP, APTT, INREXT, INREXT in the last 72 hours. No results for input(s): FE, TIBC, PSAT, FERR in the last 72 hours. No results found for: FOL, RBCF   No results for input(s): PH, PCO2, PO2 in the last 72 hours. No results for input(s): CPK, CKNDX, TROIQ in the last 72 hours.     No lab exists for component: CPKMB  Lab Results   Component Value Date/Time    Cholesterol, total 68 04/06/2021 08:33 AM    HDL Cholesterol 17 04/06/2021 08:33 AM    LDL, calculated 16.6 04/06/2021 08:33 AM    Triglyceride 172 (H) 04/06/2021 08:33 AM    CHOL/HDL Ratio 4.0 04/06/2021 08:33 AM     Lab Results   Component Value Date/Time    Glucose (POC) 110 (H) 04/06/2021 05:08 AM     Lab Results   Component Value Date/Time    Color YELLOW/STRAW 04/05/2021 05:19 AM    Appearance CLEAR 04/05/2021 05:19 AM    Specific gravity 1.025 04/05/2021 05:19 AM    pH (UA) 5.5 04/05/2021 05:19 AM    Protein 100 (A) 04/05/2021 05:19 AM    Glucose Negative 04/05/2021 05:19 AM    Ketone Negative 04/05/2021 05:19 AM    Urobilinogen 1.0 04/05/2021 05:19 AM    Nitrites Negative 04/05/2021 05:19 AM    Leukocyte Esterase Negative 04/05/2021 05:19 AM    Epithelial cells MODERATE (A) 04/05/2021 05:19 AM    Bacteria 1+ (A) 04/05/2021 05:19 AM    WBC 5-10 04/05/2021 05:19 AM    RBC 0-5 04/05/2021 05:19 AM         Medications Reviewed:     Current Facility-Administered Medications   Medication Dose Route Frequency    enoxaparin (LOVENOX) injection 40 mg  40 mg SubCUTAneous Q24H    nafcillin (NALLPEN) 2 g in 0.9% sodium chloride (MBP/ADV) 100 mL MBP  2 g IntraVENous Q4H    hydrocortisone (ANUSOL-HC) 2.5 % rectal cream   PeriANAL DAILY PRN    0.9% sodium chloride infusion 250 mL  250 mL IntraVENous PRN    sodium chloride (NS) flush 5-40 mL  5-40 mL IntraVENous Q8H    sodium chloride (NS) flush 5-40 mL  5-40 mL IntraVENous PRN    0.9% sodium chloride infusion  25 mL/hr IntraVENous CONTINUOUS    acetaminophen (TYLENOL) tablet 650 mg  650 mg Oral Q4H PRN    aspirin chewable tablet 81 mg  81 mg Oral DAILY     ______________________________________________________________________  EXPECTED LENGTH OF STAY: 4d 9h  ACTUAL LENGTH OF STAY:          [facility],

## 2021-04-12 NOTE — PROGRESS NOTES
RN spoke with patient about clinical plan for the day. Lab work is ordered by the provider however patient has been refusing lab draws unless performed by ICU nurses. This RN notified the patient that we have an ICU nurse working on the floor today that would be willing to come and attempt lab draws, the patient is agreeable. This RN also spoke with the patient about pending orders for the CT scan. The patient states they are hoping to be transferred to a different hospital today, and do not wish to have the CT performed here at [facility].

## 2021-04-13 ENCOUNTER — APPOINTMENT (OUTPATIENT)
Dept: CT IMAGING | Age: 36
DRG: 871 | End: 2021-04-13
Attending: INTERNAL MEDICINE
Payer: COMMERCIAL

## 2021-04-13 VITALS
BODY MASS INDEX: 24.66 KG/M2 | HEIGHT: 67 IN | RESPIRATION RATE: 15 BRPM | DIASTOLIC BLOOD PRESSURE: 68 MMHG | WEIGHT: 157.1 LBS | OXYGEN SATURATION: 100 % | SYSTOLIC BLOOD PRESSURE: 111 MMHG | TEMPERATURE: 99.1 F | HEART RATE: 93 BPM

## 2021-04-13 PROCEDURE — 74011000250 HC RX REV CODE- 250: Performed by: INTERNAL MEDICINE

## 2021-04-13 PROCEDURE — 74011250637 HC RX REV CODE- 250/637: Performed by: INTERNAL MEDICINE

## 2021-04-13 PROCEDURE — 74011250637 HC RX REV CODE- 250/637: Performed by: PSYCHIATRY & NEUROLOGY

## 2021-04-13 PROCEDURE — 74011000258 HC RX REV CODE- 258: Performed by: INTERNAL MEDICINE

## 2021-04-13 RX ORDER — DIPHENOXYLATE HYDROCHLORIDE AND ATROPINE SULFATE 2.5; .025 MG/1; MG/1
1 TABLET ORAL
Status: DISCONTINUED | OUTPATIENT
Start: 2021-04-13 | End: 2021-04-13 | Stop reason: HOSPADM

## 2021-04-13 RX ADMIN — Medication 10 ML: at 05:01

## 2021-04-13 RX ADMIN — NAFCILLIN SODIUM 2 G: 2 INJECTION, POWDER, LYOPHILIZED, FOR SOLUTION INTRAMUSCULAR; INTRAVENOUS at 08:22

## 2021-04-13 RX ADMIN — POTASSIUM CHLORIDE 20 MEQ: 750 TABLET, FILM COATED, EXTENDED RELEASE ORAL at 00:21

## 2021-04-13 RX ADMIN — ASPIRIN 81 MG: 81 TABLET, CHEWABLE ORAL at 08:30

## 2021-04-13 RX ADMIN — NAFCILLIN SODIUM 2 G: 2 INJECTION, POWDER, LYOPHILIZED, FOR SOLUTION INTRAMUSCULAR; INTRAVENOUS at 00:20

## 2021-04-13 RX ADMIN — NAFCILLIN SODIUM 2 G: 2 INJECTION, POWDER, LYOPHILIZED, FOR SOLUTION INTRAMUSCULAR; INTRAVENOUS at 04:59

## 2021-04-13 RX ADMIN — Medication 10 ML: at 00:20

## 2021-04-13 NOTE — PROGRESS NOTES
Problem: Falls - Risk of  Goal: *Absence of Falls  Description: Document Keo Gironke Fall Risk and appropriate interventions in the flowsheet. Outcome: Progressing Towards Goal  Note: Fall Risk Interventions:  Mobility Interventions: Bed/chair exit alarm, Communicate number of staff needed for ambulation/transfer         Medication Interventions: Patient to call before getting OOB, Teach patient to arise slowly    Elimination Interventions: Bed/chair exit alarm, Call light in reach, Elevated toilet seat, Patient to call for help with toileting needs              Problem: Pressure Injury - Risk of  Goal: *Prevention of pressure injury  Description: Document Werner Scale and appropriate interventions in the flowsheet.   Outcome: Progressing Towards Goal  Note: Pressure Injury Interventions:  Sensory Interventions: Assess changes in LOC, Avoid rigorous massage over bony prominences, Check visual cues for pain, Discuss PT/OT consult with provider         Activity Interventions: Pressure redistribution bed/mattress(bed type), Increase time out of bed, PT/OT evaluation    Mobility Interventions: HOB 30 degrees or less, PT/OT evaluation    Nutrition Interventions: Document food/fluid/supplement intake

## 2021-04-13 NOTE — PROGRESS NOTES
Hematology-Oncology Progress Note      Redd Urbina  1985  [id number]  4/13/2021      Subjective:     Has had 11 day period and feels that her anemia is appropriate for the time in her cycle. Agrees to CT scan if done without contrast.     Allergies: Patient has no known allergies.   Current Facility-Administered Medications   Medication Dose Route Frequency Provider Last Rate Last Admin    enoxaparin (LOVENOX) injection 40 mg  40 mg SubCUTAneous Q24H Heidy Esposito, DO        nafcillin (NALLPEN) 2 g in 0.9% sodium chloride (MBP/ADV) 100 mL MBP  2 g IntraVENous Q4H Chava Lobo  mL/hr at 04/13/21 0822 2 g at 04/13/21 7733    hydrocortisone (ANUSOL-HC) 2.5 % rectal cream   PeriANAL DAILY PRN Jeffry EID DO        0.9% sodium chloride infusion 250 mL  250 mL IntraVENous PRN Yumiko Purcell MD        sodium chloride (NS) flush 5-40 mL  5-40 mL IntraVENous Q8H Yumiko Purcell MD   10 mL at 04/13/21 0501    sodium chloride (NS) flush 5-40 mL  5-40 mL IntraVENous PRN Yumiko Purcell MD        0.9% sodium chloride infusion  25 mL/hr IntraVENous CONTINUOUS Jeffry EID DO 25 mL/hr at 04/12/21 0716 25 mL/hr at 04/12/21 0716    acetaminophen (TYLENOL) tablet 650 mg  650 mg Oral Q4H PRN Rana Crome, NP   650 mg at 04/12/21 1920    aspirin chewable tablet 81 mg  81 mg Oral DAILY Mayra Malloy MD   81 mg at 04/13/21 0830     Objective:     Patient Vitals for the past 24 hrs:   BP Temp Pulse Resp SpO2   04/13/21 0600 124/65 99.2 °F (37.3 °C) 88 18 100 %   04/12/21 2200 (!) 105/55 99.1 °F (37.3 °C) 80 24 100 %   04/12/21 1412 119/66 100.2 °F (37.9 °C) 86 22 100 %   04/12/21 1000  99.3 °F (37.4 °C)      04/12/21 0900  99.1 °F (37.3 °C)          Gen: NAD  HEENT: PERRL, Sclerae anicteric  Ext: No c/c/e    Available labs reviewed:  Labs:    Recent Results (from the past 24 hour(s))   CBC WITH AUTOMATED DIFF    Collection Time: 04/12/21  2:50 PM   Result Value Ref Range    WBC 12.3 (H) 3.6 - 11.0 K/uL    RBC 2.83 (L) 3.80 - 5.20 M/uL    HGB 6.6 (L) 11.5 - 16.0 g/dL    HCT 21.0 (L) 35.0 - 47.0 %    MCV 74.2 (L) 80.0 - 99.0 FL    MCH 23.3 (L) 26.0 - 34.0 PG    MCHC 31.4 30.0 - 36.5 g/dL    RDW 20.1 (H) 11.5 - 14.5 %    PLATELET 837 402 - 287 K/uL    NRBC 0.0 0  WBC    ABSOLUTE NRBC 0.00 0.00 - 0.01 K/uL    NEUTROPHILS 85 (H) 32 - 75 %    LYMPHOCYTES 8 (L) 12 - 49 %    MONOCYTES 5 5 - 13 %    EOSINOPHILS 1 0 - 7 %    BASOPHILS 0 0 - 1 %    IMMATURE GRANULOCYTES 1 (H) 0.0 - 0.5 %    ABS. NEUTROPHILS 10.5 (H) 1.8 - 8.0 K/UL    ABS. LYMPHOCYTES 1.0 0.8 - 3.5 K/UL    ABS. MONOCYTES 0.6 0.0 - 1.0 K/UL    ABS. EOSINOPHILS 0.1 0.0 - 0.4 K/UL    ABS. BASOPHILS 0.0 0.0 - 0.1 K/UL    ABS. IMM. GRANS. 0.1 (H) 0.00 - 0.04 K/UL    DF SMEAR SCANNED      RBC COMMENTS ANISOCYTOSIS  2+        RBC COMMENTS MICROCYTOSIS  1+        RBC COMMENTS HYPOCHROMIA  1+        RBC COMMENTS OVALOCYTES  PRESENT       LACTIC ACID    Collection Time: 04/12/21  2:50 PM   Result Value Ref Range    Lactic acid 0.9 0.4 - 2.0 MMOL/L   MAGNESIUM    Collection Time: 04/12/21  2:50 PM   Result Value Ref Range    Magnesium 2.0 1.6 - 2.4 mg/dL   METABOLIC PANEL, COMPREHENSIVE    Collection Time: 04/12/21  2:50 PM   Result Value Ref Range    Sodium 143 136 - 145 mmol/L    Potassium 3.0 (L) 3.5 - 5.1 mmol/L    Chloride 112 (H) 97 - 108 mmol/L    CO2 22 21 - 32 mmol/L    Anion gap 9 5 - 15 mmol/L    Glucose 98 65 - 100 mg/dL    BUN 9 6 - 20 MG/DL    Creatinine 0.65 0.55 - 1.02 MG/DL    BUN/Creatinine ratio 14 12 - 20      GFR est AA >60 >60 ml/min/1.73m2    GFR est non-AA >60 >60 ml/min/1.73m2    Calcium 7.6 (L) 8.5 - 10.1 MG/DL    Bilirubin, total 1.2 (H) 0.2 - 1.0 MG/DL    ALT (SGPT) 32 12 - 78 U/L    AST (SGOT) 56 (H) 15 - 37 U/L    Alk.  phosphatase 111 45 - 117 U/L    Protein, total 5.3 (L) 6.4 - 8.2 g/dL    Albumin 1.9 (L) 3.5 - 5.0 g/dL    Globulin 3.4 2.0 - 4.0 g/dL    A-G Ratio 0.6 (L) 1.1 - 2.2     PHOSPHORUS Collection Time: 04/12/21  2:50 PM   Result Value Ref Range    Phosphorus 2.8 2.6 - 4.7 MG/DL   PROTHROMBIN TIME + INR    Collection Time: 04/12/21  2:50 PM   Result Value Ref Range    INR 1.2 (H) 0.9 - 1.1      Prothrombin time 12.4 (H) 9.0 - 11.1 sec   TROPONIN I    Collection Time: 04/12/21  2:50 PM   Result Value Ref Range    Troponin-I, Qt. 0.10 (H) <0.05 ng/mL         Assessment and Plan     29 y/o woman with CNS lesions and MSSA bacteremia, BRITTA negative. Asked to see for fe def anemia. 1. Anemia: likely multifactorial due to menorrhagia. Reasonable to hold pRBCs. 2. ID: She has agreed to CT C/A/P without oral or IV contrast so she can eat as she was hungry during the first 5 days of admission. I have explained to her and her mother the gravity of her situation,a life-threatening infection without a clear source. Thank you for allowing us to participate in the care of this very pleasant patient.     Otto Last MD  Hematology/Oncology  Phone [phone]

## 2021-04-13 NOTE — PROGRESS NOTES
Patient finished 0800 antibiotics and signed informed refusal paperwork. Copy placed on chart. Officer from ED was called and escorted nurse and tech downstairs with mother and patient.

## 2021-04-13 NOTE — PROGRESS NOTES
Patient declined lovenox injection and denied any pain. She did express abdominal discomfort and stool changes since being on antibiotics. Risks and benefits of declining lovenox were explained. Discussed with patient that morning labs would be helpful in providing care. Also let her know that blood cultures had been ordered, as the ones drawn on 4/9 were growing positive. Patient and mother at bedside were asked multiple times if there were any questions for staff or anything I could do for them. Dr. Wilbert Ruiz at bedside as well and calmly educated patient on the seriousness of her bacterial infection. Patient and mother also had all of their questions answered by Dr. Wilbert Ruiz and were agreeable and content with CT scans being completed.

## 2021-04-13 NOTE — PROGRESS NOTES
Went to give midnight IV Abx and pt stated \"she cannot do morning labs or 2am blood pressure check\". Stated, to try in the AM when I get vitals prior to shift change.

## 2021-04-13 NOTE — DISCHARGE SUMMARY
Discharge Summary       PATIENT ID: Nicanor Agustin  MRN: [id number]   YOB: 1985    DATE OF ADMISSION: 4/6/2021  4:13 AM    DATE OF DISCHARGE: 4/13/2021  PRIMARY CARE PROVIDER: Jacques Olivera MD     ATTENDING PHYSICIAN: Nell Sanders DO   DISCHARGING PROVIDER: Nell Sanders DO    To contact this individual call [phone] and ask the  to page. If unavailable ask to be transferred the Adult Hospitalist Department. CONSULTATIONS: IP CONSULT TO HOSPITALIST  IP CONSULT TO NEUROLOGY  IP CONSULT TO HEMATOLOGY  IP CONSULT TO INFECTIOUS DISEASES  IP CONSULT TO CARDIOLOGY    PROCEDURES/SURGERIES: * No surgery found *    ADMITTING DIAGNOSES & HOSPITAL COURSE:     28year old female with history of menorrhagia and no other significant past medical history presenting to [facility] on 4/6/2021 with dizziness. She was previously seen at another emergency department for fevers, chills, vomiting and subsequently discharged. In the ED, CT head demonstrated concerns for cerebellar stroke vs mass and she was admitted for further evaluation. Patient was found to have MSSA bacteremia. MRI brain demonstrated numerous cerebellar infarcts. She underwent TTE and BRITTA, both found without vegetations. Despite negative BRITTA, patient continued to have positive blood cultures and further evidence of endocarditis, including persistent fevers, lesions concerning both for Janeway and Oslers. Unfortunately, patient intermittently declined further work up including lab draws, blood cultures, CT scans, blood transfusions and became distrustful towards all medical staff. Multiple efforts were to made to address all concerns regarding the hospital work flow and necessity of further work up. She was educated on seriousness of sepsis secondary to MSSA bacteremia including death. Multiple specialities including myself discussed that her refusal of care put her in further risk for complications. Patient stated that she felt that she was being held against her will in the hospital. Appropriate medical discharge by a physician was explained to patient and included clear blood cultures, resolution of fever, and better understanding of underlying infection. The AGAINST MEDICAL ADVICE process was explained to patient's mother on 4/12 and the patient on 4/13. Patient stated that she was going to leave. I recommended against it. If she were to leave, I recommended going straight to another emergency department as her medical diagnosis can only be treated with IV antibiotics. Patient was voiced understanding of risks of leaving. She signed out [address] MRI brain w/ w/o contrast:  IMPRESSION  Numerous supratentorial and infratentorial acute infarcts as above, largest  in the left superior cerebellum, most consistent with embolic etiology. CT head:  IMPRESSION  Areas of low-attenuation in both cerebellar hemispheres, left greater than  right, may represent old infarcts, although underlying mass lesions cannot be  excluded; consider further evaluation with contrast enhanced brain MRI. No  evidence of acute infarct or intracranial hemorrhage. CTA head/CT perfusion:  IMPRESSION  No evidence for acute large vessel arterial occlusion or significant stenosis. No evidence for ischemic penumbra. Small focal perfusion abnormality in the left  cerebellum of uncertain significance corresponding to focal area of  hypoattenuation in the left cerebellum. Consider further evaluation with MR of  the brain    BRITTA:   Left Ventricle Normal cavity size, wall thickness and systolic function (ejection fraction normal). The estimated EF is 65 - 70%. Left Atrium Normal cavity size. Left atrial appendage size is moderate. There is no thrombus within the left atrial appendage. There is no mass within the left atrial appendage. Right Ventricle Normal cavity size and global systolic function.    Right Atrium Normal cavity size. Interatrial Septum No atrial septal defect present. No patent foramen ovale visualized. Aortic Valve Normal valve structure, no stenosis and no regurgitation. There is no vegetation on the aortic valve. Mitral Valve Normal valve structure, no stenosis and no regurgitation. There is no vegetation on the mitral valve. Tricuspid Valve Normal valve structure, no stenosis and no regurgitation. Pulmonic Valve Pulmonic valve not well visualized. No stenosis and no regurgitation. Aorta Normal aortic root. Pericardium Small circumferential pericardial effusion noted. No indications of tamponade present. TTE:  · Saline contrast was given to evaluate for intracardiac shunt. · LV: Estimated LVEF is 50 - 55%. Normal cavity size, wall thickness and diastolic function. Low normal systolic function. · RV: Not well visualized. · IAS: No atrial septal defect present. Agitated saline contrast study was performed. Duplex lower extremity:  · No evidence of right or left lower extremity vein thrombosis. DISCHARGE DIAGNOSES / PLAN:      Sepsis (leukocytosis, fever, tachycardia) secondary to MSSA bacteremia, concerning for infective endocarditis:  Acute CVA consistent with infectious emboli:   Peripheral lesions concerning for Osler and Janeway lesions:  -Presented with dizziness  -CT head demonstrated low attenuation in both cerebellar hemispheres, possible old infarcts, underlying mass no excluded. S/p decadron, keppra on admission- now discontinued   -MRI with numerous supratentorial and infratentorial acute infarcts, largest in the left superior cerebellum, consistent with embolic etiology   -Blood cultures 4/6 with MSSA. Repeat blood cultures 4/7 with 2/4 MRSSA. Repeat blood cultures 4/9 now with 1/4 GPC in clusters. Patient refused further blood cultures.  -Continue naficillin monotherapy. S/p vancomycin, cefazolin.  ID consulted and followed during admission  -BRITTA 4/8 negative   -CT chest/abdomen/pelvis with contrast pending since 4/9 (patient refused)     Acute on Chronic blood loss anemia:  -multifactorial in setting of acute sepsis and chronic menorrhagia   -s/p 1 unit pRBCs 4/6/2021, venofer   -Hgb 6/6 on 4/12. Recommended additional transfusion and patient refused      Hypokalemia: repleted  Nausea vomiting: resolved  Diarrhea: intermittent   Elevated troponin:  -suspected to demand supply mismatch from sepsis and   Swelling:   -potentially multifactorial in setting of sepsis and volume repletion. Lower extremity dopplers negative           PATIENT CONDITION AT DISCHARGE:     Functional status    Poor    x Deconditioned     Independent      Cognition   x  Lucid     Forgetful     Dementia      Catheters/lines (plus indication)    Montes     PICC     PEG    x None      Code status   x  Full code     DNR      PHYSICAL EXAMINATION AT DISCHARGE:  Constitutional:  No acute distress, tired appearing    ENT:  Oral mucosa moist, oropharynx benign. Resp:  CTA bilaterally. No wheezing/rhonchi/rales. No accessory muscle use   CV:  Regular rhythm, normal rate, no murmurs, gallops, rubs    GI:  Soft, non distended, non tender.  normoactive bowel sounds    Musculoskeletal:  non-pitting swollen extremities in arms and legs, warm, 2+ pulses throughout  Skin: macular papular erythematous nodes on palms and soles, papular lesions on toes and fingers      Neurologic:  no focal deficits with overall generalized weakness in all 4 extremities, subjective dizziness upon standing                                    CHRONIC MEDICAL DIAGNOSES:  Problem List as of 4/13/2021 Never Reviewed          Codes Class Noted - Resolved    Stroke (cerebrum) ([facility] [address] ICD-10-CM: I63.9  ICD-9-CM: 434.91  4/7/2021 - Present        Dizziness ICD-10-CM: R42  ICD-9-CM: 780.4  4/6/2021 - Present              Greater than >40 minutes were spent with the patient on counseling and coordination of care    Signed:   Darryle Sensor DO Vaibhav  4/13/2021  9:52 AM

## 2021-04-13 NOTE — PROGRESS NOTES
Two police officers and security guards came looking for nurse for patient. Patient had called the non emergency line. When police were in the room, patient stated they wanted accompanying to their vehicle as they did not feel safe to leave by themselves. Patient and police agreed that nursing staff would call police when antibiotic was finished infusing.

## 2021-04-14 LAB
BACTERIA SPEC CULT: ABNORMAL
SERVICE CMNT-IMP: ABNORMAL

## 2021-04-15 NOTE — PROGRESS NOTES
Physician Progress Note      PATIENT:               Emeka Stauffer  CSN #:                  [id number]  :                       1985  ADMIT DATE:       2021 4:13 AM  DISCH DATE:        2021 10:58 AM  RESPONDING  PROVIDER #:        Saqib Tamez DO          QUERY TEXT:    Dear attending,    Pt admitted with MSSA bacteremia . Pt noted to have documentation of sepsis in the medical record. Noted fever, elevated CRP upon admission. If possible, please document in progress notes and discharge summary the present on admission status of: The medical record reflects the following:  Risk Factors: patient refusing testing  Clinical Indicators:-WBC 3.8,  neutrophils 88, CRP 22.20, 101.9, 101.8,  -temp 102.6, -BC + -WBC 12.3, neutrophils 85, Temp 100.2  Per H&P- Fever SARS Covid test PCR negative yesterday from [facility]  -If continues to have fever we will repeat that test  -No fever documented in the ED patient is on room air less likely Covid  -Chest x-ray 4/3 no acute pulmonary issue  -Per PN- Fever persistent with 101.4 overnight and 100.1 this AM.  -per ID- MSSA bacteremia--uncertain source, but most likely endocarditis--vegetation may well have fractured, resulting in CNS event and that would explain the negative BRITTA. Certainly, other sources are possible, as well. Plan on 4-6 weeks IV antibiotic therapy  Per dc summary- Despite negative BRITTA, patient continued to have positive blood cultures and further evidence of endocarditis, including persistent fevers, lesions concerning both for Janeway and Oslers  Sepsis (leukocytosis, fever, tachycardia) secondary to MSSA bacteremia.   Treatment: Monitor labwork, vital signs, imaging, ID consult    Thank you,  Gunnar Matthews RN, BSN  Clinical documentation Improvement  [phone]  Options provided:  -- Yes, sepsis was present at the time of the order to admit to the hospital  -- No, sepsis was not present on admission and developed during the inpatient stay  -- Other - I will add my own diagnosis  -- Disagree - Not applicable / Not valid  -- Disagree - Clinically unable to determine / Unknown  -- Refer to Clinical Documentation Reviewer    PROVIDER RESPONSE TEXT:    Yes, sepsis was present at the time of the order to admit to the hospital.    Query created by: Cristino Argueta on 4/15/2021 10:17 AM      Electronically signed by:  Nuris Means DO 4/15/2021 3:28 PM